# Patient Record
Sex: FEMALE | Race: WHITE | Employment: PART TIME | URBAN - METROPOLITAN AREA
[De-identification: names, ages, dates, MRNs, and addresses within clinical notes are randomized per-mention and may not be internally consistent; named-entity substitution may affect disease eponyms.]

---

## 2017-02-02 ENCOUNTER — TELEPHONE (OUTPATIENT)
Dept: FAMILY MEDICINE CLINIC | Facility: CLINIC | Age: 22
End: 2017-02-02

## 2017-02-02 NOTE — TELEPHONE ENCOUNTER
No PA required for Ondansetron 8mg tablets pt can take up to 1 tablet per day.   [facility]#[id number]

## 2017-02-08 ENCOUNTER — LAB ENCOUNTER (OUTPATIENT)
Dept: LAB | Age: 22
End: 2017-02-08
Attending: FAMILY MEDICINE
Payer: COMMERCIAL

## 2017-02-08 DIAGNOSIS — R79.89 ABNORMAL CBC: ICD-10-CM

## 2017-02-08 DIAGNOSIS — E55.9 VITAMIN D DEFICIENCY: ICD-10-CM

## 2017-02-08 LAB
25-HYDROXYVITAMIN D (TOTAL): 28.5 NG/ML (ref 30–100)
BASOPHILS # BLD AUTO: 0.06 X10(3) UL (ref 0–0.1)
BASOPHILS NFR BLD AUTO: 0.6 %
DEPRECATED HBV CORE AB SER IA-ACNC: 5.2 NG/ML (ref 10–291)
EOSINOPHIL # BLD AUTO: 0.27 X10(3) UL (ref 0–0.3)
EOSINOPHIL NFR BLD AUTO: 2.8 %
ERYTHROCYTE [DISTWIDTH] IN BLOOD BY AUTOMATED COUNT: 15.5 % (ref 11.5–16)
HCT VFR BLD AUTO: 40.7 % (ref 34–50)
HGB BLD-MCNC: 13.2 G/DL (ref 12–16)
IMMATURE GRANULOCYTE COUNT: 0.03 X10(3) UL (ref 0–1)
IMMATURE GRANULOCYTE RATIO %: 0.3 %
IRON SATURATION: 10 % (ref 13–45)
IRON: 38 UG/DL (ref 28–170)
LYMPHOCYTES # BLD AUTO: 2.03 X10(3) UL (ref 0.9–4)
LYMPHOCYTES NFR BLD AUTO: 21.4 %
MCH RBC QN AUTO: 26.9 PG (ref 27–33.2)
MCHC RBC AUTO-ENTMCNC: 32.4 G/DL (ref 31–37)
MCV RBC AUTO: 83.1 FL (ref 81–100)
MONOCYTES # BLD AUTO: 0.79 X10(3) UL (ref 0.1–0.6)
MONOCYTES NFR BLD AUTO: 8.3 %
NEUTROPHIL ABS PRELIM: 6.3 X10 (3) UL (ref 1.3–6.7)
NEUTROPHILS # BLD AUTO: 6.3 X10(3) UL (ref 1.3–6.7)
NEUTROPHILS NFR BLD AUTO: 66.6 %
PLATELET # BLD AUTO: 323 10(3)UL (ref 150–450)
RBC # BLD AUTO: 4.9 X10(6)UL (ref 3.8–5.1)
RED CELL DISTRIBUTION WIDTH-SD: 46.6 FL (ref 35.1–46.3)
TOTAL IRON BINDING CAPACITY: 392 UG/DL (ref 298–536)
TRANSFERRIN: 263 MG/DL (ref 200–360)
WBC # BLD AUTO: 9.5 X10(3) UL (ref 4–13)

## 2017-02-09 ENCOUNTER — TELEPHONE (OUTPATIENT)
Dept: FAMILY MEDICINE CLINIC | Facility: CLINIC | Age: 22
End: 2017-02-09

## 2017-02-09 DIAGNOSIS — R79.0 LOW IRON STORES: ICD-10-CM

## 2017-02-09 DIAGNOSIS — E55.9 VITAMIN D DEFICIENCY: Primary | ICD-10-CM

## 2017-02-09 RX ORDER — ERGOCALCIFEROL 1.25 MG/1
50000 CAPSULE ORAL WEEKLY
Qty: 12 CAPSULE | Refills: 0 | Status: SHIPPED | OUTPATIENT
Start: 2017-02-09 | End: 2017-05-10

## 2017-02-09 NOTE — TELEPHONE ENCOUNTER
Spoke to patient with results/instructions. Pt verbalized understanding.   Med sent to pharmacy and labs in system for 3 months

## 2017-02-09 NOTE — PROGRESS NOTES
Quick Note:    Lab results showed low Vitamin D. Advise RX vitamin D 50,000 IU once weekly for 12 weeks. Recheck vitamin D level in 3 months. After labs we will direct you on the dose of vitamin D needed OTC.     Ferritin is low this is her iron storage is

## 2017-02-09 NOTE — TELEPHONE ENCOUNTER
----- Message from Vikram Babcock PA-C sent at 2/8/2017  9:03 PM CST -----  Lab results showed low Vitamin D. Advise RX vitamin D 50,000 IU once weekly for 12 weeks. Recheck vitamin D level in 3 months.   After labs we will direct you on the dose of vit

## 2017-02-16 RX ORDER — LEVONORGESTREL AND ETHINYL ESTRADIOL 0.1-0.02MG
KIT ORAL
Qty: 28 TABLET | Refills: 1 | Status: SHIPPED | OUTPATIENT
Start: 2017-02-16 | End: 2017-03-13

## 2017-02-16 NOTE — TELEPHONE ENCOUNTER
rx for BCP approved qty 1 month + 1 refill  Patient due for annual physical 3/11/17 or later  Please call to schedule appt  [phone] (home)

## 2017-03-08 ENCOUNTER — APPOINTMENT (OUTPATIENT)
Dept: LAB | Age: 22
End: 2017-03-08
Attending: OTOLARYNGOLOGY
Payer: COMMERCIAL

## 2017-03-08 DIAGNOSIS — E04.2 MULTIPLE THYROID NODULES: ICD-10-CM

## 2017-03-08 DIAGNOSIS — E07.9 THYROID DYSFUNCTION: ICD-10-CM

## 2017-03-08 PROCEDURE — 82310 ASSAY OF CALCIUM: CPT

## 2017-03-08 PROCEDURE — 36415 COLL VENOUS BLD VENIPUNCTURE: CPT

## 2017-03-08 PROCEDURE — 83970 ASSAY OF PARATHORMONE: CPT

## 2017-03-13 ENCOUNTER — OFFICE VISIT (OUTPATIENT)
Dept: FAMILY MEDICINE CLINIC | Facility: CLINIC | Age: 22
End: 2017-03-13

## 2017-03-13 VITALS
WEIGHT: 215 LBS | HEART RATE: 92 BPM | HEIGHT: 69.25 IN | BODY MASS INDEX: 31.48 KG/M2 | DIASTOLIC BLOOD PRESSURE: 70 MMHG | SYSTOLIC BLOOD PRESSURE: 94 MMHG

## 2017-03-13 DIAGNOSIS — Z00.00 WELL FEMALE EXAM WITHOUT GYNECOLOGICAL EXAM: Primary | ICD-10-CM

## 2017-03-13 DIAGNOSIS — E66.9 OBESITY (BMI 30.0-34.9): ICD-10-CM

## 2017-03-13 DIAGNOSIS — E04.9 GOITER: ICD-10-CM

## 2017-03-13 DIAGNOSIS — F41.1 GAD (GENERALIZED ANXIETY DISORDER): ICD-10-CM

## 2017-03-13 DIAGNOSIS — F33.42 MAJOR DEPRESSIVE DISORDER, RECURRENT, IN FULL REMISSION WITH ANXIOUS DISTRESS (HCC): ICD-10-CM

## 2017-03-13 DIAGNOSIS — Z11.3 VENEREAL DISEASE SCREENING: ICD-10-CM

## 2017-03-13 DIAGNOSIS — F17.200 SMOKER: ICD-10-CM

## 2017-03-13 DIAGNOSIS — Z79.899 MEDICATION MANAGEMENT: ICD-10-CM

## 2017-03-13 PROCEDURE — 87491 CHLMYD TRACH DNA AMP PROBE: CPT | Performed by: FAMILY MEDICINE

## 2017-03-13 PROCEDURE — 87591 N.GONORRHOEAE DNA AMP PROB: CPT | Performed by: FAMILY MEDICINE

## 2017-03-13 PROCEDURE — 99395 PREV VISIT EST AGE 18-39: CPT | Performed by: FAMILY MEDICINE

## 2017-03-13 PROCEDURE — 99213 OFFICE O/P EST LOW 20 MIN: CPT | Performed by: FAMILY MEDICINE

## 2017-03-13 RX ORDER — ONDANSETRON HYDROCHLORIDE 8 MG/1
8 TABLET, FILM COATED ORAL DAILY
Qty: 30 TABLET | Refills: 5 | Status: SHIPPED | OUTPATIENT
Start: 2017-03-13 | End: 2017-09-27

## 2017-03-13 RX ORDER — LEVONORGESTREL AND ETHINYL ESTRADIOL 0.1-0.02MG
1 KIT ORAL
Qty: 28 TABLET | Refills: 11 | Status: SHIPPED | OUTPATIENT
Start: 2017-03-13 | End: 2018-02-12

## 2017-03-13 RX ORDER — BUSPIRONE HYDROCHLORIDE 10 MG/1
10 TABLET ORAL 3 TIMES DAILY
Qty: 90 TABLET | Refills: 5 | Status: SHIPPED | OUTPATIENT
Start: 2017-03-13 | End: 2017-10-25

## 2017-03-13 RX ORDER — CLONAZEPAM 0.5 MG/1
TABLET ORAL
Qty: 60 TABLET | Refills: 2 | Status: ON HOLD | OUTPATIENT
Start: 2017-03-13 | End: 2017-04-05

## 2017-03-13 NOTE — PROGRESS NOTES
HPI:   Mahin Soto is a 19year old female who presents for a complete physical exam. Symptoms: denies discharge, itching, burning or dysuria, periods once every 2 months.     pap 3/16 no abnormals  Sexually active yes occasional condom same partner p 10 MG Oral Tab Take 1 tablet (10 mg total) by mouth 3 (three) times daily. Disp: 90 tablet Rfl: 5   ergocalciferol 74304 units Oral Cap Take 1 capsule (50,000 Units total) by mouth once a week.  Disp: 12 capsule Rfl: 0      Past Medical History   Diagnosis 02/13/2017  Body mass index is 31.52 kg/(m^2).    GENERAL: well developed, well nourished and in no apparent distress  SKIN dry skin hyperkeratotic skin especially on legs  HEENT: atraumatic, normocephalic,ears, nose and throat are normal  EYES: PERRLA, EOM mouth 3 (three) times daily. Imaging & Consults:  None  1. Well female exam without gynecological exam  Self breast exam explained.  Health maintenance guidance given including vision and dental exams, vitamin D 1,000 iu daily with calcium 1,500 m (BMI 30.0-34. 9)  Weight loss through lifestyle changes decrease carbohydrates and increase exercise. - Lipid Panel [E]; Future    5. Venereal disease screening  Condoms advised  - Chlamydia/Gc Amplification [E];  Future  - Chlamydia/Gc Amplification [E]

## 2017-03-14 LAB
C TRACH DNA SPEC QL NAA+PROBE: NEGATIVE
N GONORRHOEA DNA SPEC QL NAA+PROBE: NEGATIVE

## 2017-03-15 ENCOUNTER — TELEPHONE (OUTPATIENT)
Dept: FAMILY MEDICINE CLINIC | Facility: CLINIC | Age: 22
End: 2017-03-15

## 2017-03-15 NOTE — TELEPHONE ENCOUNTER
----- Message from Radha Esparza PA-C sent at 3/14/2017  8:22 PM CDT -----  Negative gonorrhea and Chlamydia

## 2017-04-05 ENCOUNTER — ANESTHESIA EVENT (OUTPATIENT)
Dept: SURGERY | Facility: HOSPITAL | Age: 22
End: 2017-04-05
Payer: COMMERCIAL

## 2017-04-05 ENCOUNTER — SURGERY (OUTPATIENT)
Age: 22
End: 2017-04-05

## 2017-04-05 ENCOUNTER — HOSPITAL ENCOUNTER (OUTPATIENT)
Facility: HOSPITAL | Age: 22
Setting detail: OBSERVATION
Discharge: HOME | End: 2017-04-06
Attending: OTOLARYNGOLOGY | Admitting: OTOLARYNGOLOGY
Payer: COMMERCIAL

## 2017-04-05 ENCOUNTER — ANESTHESIA (OUTPATIENT)
Dept: SURGERY | Facility: HOSPITAL | Age: 22
End: 2017-04-05
Payer: COMMERCIAL

## 2017-04-05 DIAGNOSIS — E07.9 DISEASE OF THYROID GLAND: ICD-10-CM

## 2017-04-05 DIAGNOSIS — E04.2 NONTOXIC MULTINODULAR GOITER: ICD-10-CM

## 2017-04-05 PROCEDURE — 88342 IMHCHEM/IMCYTCHM 1ST ANTB: CPT | Performed by: OTOLARYNGOLOGY

## 2017-04-05 PROCEDURE — 88341 IMHCHEM/IMCYTCHM EA ADD ANTB: CPT | Performed by: OTOLARYNGOLOGY

## 2017-04-05 PROCEDURE — 0GTK0ZZ RESECTION OF THYROID GLAND, OPEN APPROACH: ICD-10-PCS | Performed by: OTOLARYNGOLOGY

## 2017-04-05 PROCEDURE — 83970 ASSAY OF PARATHORMONE: CPT | Performed by: OTOLARYNGOLOGY

## 2017-04-05 PROCEDURE — 82310 ASSAY OF CALCIUM: CPT | Performed by: OTOLARYNGOLOGY

## 2017-04-05 PROCEDURE — 83735 ASSAY OF MAGNESIUM: CPT | Performed by: OTOLARYNGOLOGY

## 2017-04-05 PROCEDURE — 81025 URINE PREGNANCY TEST: CPT | Performed by: OTOLARYNGOLOGY

## 2017-04-05 PROCEDURE — 88307 TISSUE EXAM BY PATHOLOGIST: CPT | Performed by: OTOLARYNGOLOGY

## 2017-04-05 RX ORDER — DIPHENHYDRAMINE HYDROCHLORIDE 50 MG/ML
12.5 INJECTION INTRAMUSCULAR; INTRAVENOUS AS NEEDED
Status: DISCONTINUED | OUTPATIENT
Start: 2017-04-05 | End: 2017-04-05 | Stop reason: HOSPADM

## 2017-04-05 RX ORDER — CALCITRIOL 0.25 UG/1
0.25 CAPSULE, LIQUID FILLED ORAL ONCE
Status: COMPLETED | OUTPATIENT
Start: 2017-04-05 | End: 2017-04-05

## 2017-04-05 RX ORDER — CALCIUM CARBONATE 500(1250)
TABLET ORAL
Status: COMPLETED
Start: 2017-04-05 | End: 2017-04-05

## 2017-04-05 RX ORDER — SODIUM CHLORIDE, SODIUM LACTATE, POTASSIUM CHLORIDE, CALCIUM CHLORIDE 600; 310; 30; 20 MG/100ML; MG/100ML; MG/100ML; MG/100ML
INJECTION, SOLUTION INTRAVENOUS CONTINUOUS
Status: DISCONTINUED | OUTPATIENT
Start: 2017-04-05 | End: 2017-04-05

## 2017-04-05 RX ORDER — HYDROCODONE BITARTRATE AND ACETAMINOPHEN 5; 325 MG/1; MG/1
1 TABLET ORAL AS NEEDED
Status: DISCONTINUED | OUTPATIENT
Start: 2017-04-05 | End: 2017-04-05 | Stop reason: HOSPADM

## 2017-04-05 RX ORDER — HYDROCODONE BITARTRATE AND ACETAMINOPHEN 5; 325 MG/1; MG/1
2 TABLET ORAL AS NEEDED
Status: DISCONTINUED | OUTPATIENT
Start: 2017-04-05 | End: 2017-04-05 | Stop reason: HOSPADM

## 2017-04-05 RX ORDER — NALOXONE HYDROCHLORIDE 0.4 MG/ML
80 INJECTION, SOLUTION INTRAMUSCULAR; INTRAVENOUS; SUBCUTANEOUS AS NEEDED
Status: DISCONTINUED | OUTPATIENT
Start: 2017-04-05 | End: 2017-04-05 | Stop reason: HOSPADM

## 2017-04-05 RX ORDER — CLONAZEPAM 0.5 MG/1
TABLET ORAL 2 TIMES DAILY PRN
COMMUNITY
End: 2017-10-26

## 2017-04-05 RX ORDER — HYDROCODONE BITARTRATE AND ACETAMINOPHEN 5; 325 MG/1; MG/1
1 TABLET ORAL EVERY 4 HOURS PRN
Status: DISCONTINUED | OUTPATIENT
Start: 2017-04-05 | End: 2017-04-06

## 2017-04-05 RX ORDER — ONDANSETRON 2 MG/ML
4 INJECTION INTRAMUSCULAR; INTRAVENOUS AS NEEDED
Status: DISCONTINUED | OUTPATIENT
Start: 2017-04-05 | End: 2017-04-05 | Stop reason: HOSPADM

## 2017-04-05 RX ORDER — ACETAMINOPHEN 325 MG/1
650 TABLET ORAL EVERY 4 HOURS PRN
Status: DISCONTINUED | OUTPATIENT
Start: 2017-04-05 | End: 2017-04-06

## 2017-04-05 RX ORDER — CALCIUM GLUCONATE-DEXTROSE IV SOLN 2 GRAM/100ML-5% 2-5/100 GM/ML-%
2 SOLUTION INTRAVENOUS ONCE AS NEEDED
Status: ACTIVE | OUTPATIENT
Start: 2017-04-05 | End: 2017-04-05

## 2017-04-05 RX ORDER — MIDAZOLAM HYDROCHLORIDE 1 MG/ML
1 INJECTION INTRAMUSCULAR; INTRAVENOUS EVERY 5 MIN PRN
Status: DISCONTINUED | OUTPATIENT
Start: 2017-04-05 | End: 2017-04-05 | Stop reason: HOSPADM

## 2017-04-05 RX ORDER — BUPIVACAINE HYDROCHLORIDE AND EPINEPHRINE 5; 5 MG/ML; UG/ML
INJECTION, SOLUTION EPIDURAL; INTRACAUDAL; PERINEURAL AS NEEDED
Status: DISCONTINUED | OUTPATIENT
Start: 2017-04-05 | End: 2017-04-05 | Stop reason: HOSPADM

## 2017-04-05 RX ORDER — LABETALOL HYDROCHLORIDE 5 MG/ML
5 INJECTION, SOLUTION INTRAVENOUS EVERY 10 MIN PRN
Status: DISCONTINUED | OUTPATIENT
Start: 2017-04-05 | End: 2017-04-05 | Stop reason: HOSPADM

## 2017-04-05 RX ORDER — CALCIUM CARBONATE 500(1250)
1000 TABLET ORAL
Status: DISCONTINUED | OUTPATIENT
Start: 2017-04-05 | End: 2017-04-06

## 2017-04-05 RX ORDER — DIPHENHYDRAMINE HYDROCHLORIDE 50 MG/ML
INJECTION INTRAMUSCULAR; INTRAVENOUS
Status: COMPLETED
Start: 2017-04-05 | End: 2017-04-05

## 2017-04-05 RX ORDER — MEPERIDINE HYDROCHLORIDE 25 MG/ML
INJECTION INTRAMUSCULAR; INTRAVENOUS; SUBCUTANEOUS
Status: COMPLETED
Start: 2017-04-05 | End: 2017-04-05

## 2017-04-05 RX ORDER — HYDROMORPHONE HYDROCHLORIDE 1 MG/ML
INJECTION, SOLUTION INTRAMUSCULAR; INTRAVENOUS; SUBCUTANEOUS
Status: COMPLETED
Start: 2017-04-05 | End: 2017-04-05

## 2017-04-05 RX ORDER — HYDROCODONE BITARTRATE AND ACETAMINOPHEN 5; 325 MG/1; MG/1
2 TABLET ORAL EVERY 4 HOURS PRN
Status: DISCONTINUED | OUTPATIENT
Start: 2017-04-05 | End: 2017-04-06

## 2017-04-05 RX ORDER — CALCIUM CARBONATE 500(1250)
1000 TABLET ORAL ONCE
Status: COMPLETED | OUTPATIENT
Start: 2017-04-05 | End: 2017-04-05

## 2017-04-05 RX ORDER — DEXTROSE, SODIUM CHLORIDE, SODIUM LACTATE, POTASSIUM CHLORIDE, AND CALCIUM CHLORIDE 5; .6; .31; .03; .02 G/100ML; G/100ML; G/100ML; G/100ML; G/100ML
INJECTION, SOLUTION INTRAVENOUS CONTINUOUS
Status: DISCONTINUED | OUTPATIENT
Start: 2017-04-05 | End: 2017-04-06

## 2017-04-05 RX ORDER — LABETALOL HYDROCHLORIDE 5 MG/ML
INJECTION, SOLUTION INTRAVENOUS
Status: COMPLETED
Start: 2017-04-05 | End: 2017-04-05

## 2017-04-05 RX ORDER — LEVONORGESTREL AND ETHINYL ESTRADIOL 0.1-0.02MG
1 KIT ORAL
Status: DISCONTINUED | OUTPATIENT
Start: 2017-04-05 | End: 2017-04-06

## 2017-04-05 RX ORDER — MIDAZOLAM HYDROCHLORIDE 1 MG/ML
INJECTION INTRAMUSCULAR; INTRAVENOUS
Status: COMPLETED
Start: 2017-04-05 | End: 2017-04-05

## 2017-04-05 RX ORDER — HYDROMORPHONE HYDROCHLORIDE 1 MG/ML
0.4 INJECTION, SOLUTION INTRAMUSCULAR; INTRAVENOUS; SUBCUTANEOUS EVERY 5 MIN PRN
Status: DISCONTINUED | OUTPATIENT
Start: 2017-04-05 | End: 2017-04-05 | Stop reason: HOSPADM

## 2017-04-05 RX ORDER — MEPERIDINE HYDROCHLORIDE 25 MG/ML
12.5 INJECTION INTRAMUSCULAR; INTRAVENOUS; SUBCUTANEOUS AS NEEDED
Status: COMPLETED | OUTPATIENT
Start: 2017-04-05 | End: 2017-04-05

## 2017-04-05 RX ORDER — CALCITRIOL 0.25 UG/1
CAPSULE, LIQUID FILLED ORAL
Status: COMPLETED
Start: 2017-04-05 | End: 2017-04-05

## 2017-04-05 RX ORDER — ONDANSETRON 2 MG/ML
4 INJECTION INTRAMUSCULAR; INTRAVENOUS EVERY 6 HOURS PRN
Status: DISCONTINUED | OUTPATIENT
Start: 2017-04-05 | End: 2017-04-06

## 2017-04-05 RX ORDER — CALCITRIOL 0.25 UG/1
0.25 CAPSULE, LIQUID FILLED ORAL DAILY
Status: DISCONTINUED | OUTPATIENT
Start: 2017-04-05 | End: 2017-04-06

## 2017-04-05 NOTE — ANESTHESIA POSTPROCEDURE EVALUATION
[address] [place],9D Patient Status:  Outpatient in a Bed   Age/Gender 19year old female MRN [id number]   [facility] SURGERY Attending Laurita Singh, [address] [facility] [address] Day # 0 PCP Mehul Coley DO       Anesthesia Post-op Note

## 2017-04-05 NOTE — PLAN OF CARE
Pt tolerated CL without nausea; diet advanced to low residue. IVF discontinued; pt saline-locked. Pt up to bathroom with steady gait and voided without container. Anterior neck incision CDI; no edema. Pt denies any numbness/tingling or cramping.  HOB re

## 2017-04-05 NOTE — PLAN OF CARE
METABOLIC/FLUID AND ELECTROLYTES - ADULT    • Electrolytes maintained within normal limits Progressing        NEUROLOGICAL - ADULT    • Achieves stable or improved neurological status Progressing        PAIN - ADULT    • Verbalizes/displays adequate comfor

## 2017-04-05 NOTE — ANESTHESIA PREPROCEDURE EVALUATION
PRE-OP EVALUATION    Patient Name: Isela Rogel    Pre-op Diagnosis: Nontoxic multinodular goiter [E04.2]  Disease of thyroid gland [E07.9]    Procedure(s):  Total Thyroidectomy, Right lobe first    Surgeon(s) and Role:     Julia Sanchez MD - Pr Types: Cigarettes    Smokeless tobacco: Never Used    Comment: 6 cigarettes daily    Alcohol Use: Yes    Comment: rarely       Drug Use: No     Available pre-op labs reviewed.     Lab Results  Component Value Date   WBC 9.5 02/08/2017   RBC 4.90 02/08/2017

## 2017-04-05 NOTE — BRIEF OP NOTE
[address] SURGERY  Brief Op Note     [place] Location: OR   CSN [id number] MRN [id number]   Admission Date 4/5/2017 Operation Date 4/5/2017   Attending Physician Carola Sosa MD Operating Physician Kathleene Holter, MD       Pre-Operative

## 2017-04-06 VITALS
OXYGEN SATURATION: 100 % | WEIGHT: 215 LBS | TEMPERATURE: 99 F | HEART RATE: 85 BPM | SYSTOLIC BLOOD PRESSURE: 122 MMHG | RESPIRATION RATE: 18 BRPM | HEIGHT: 69 IN | BODY MASS INDEX: 31.84 KG/M2 | DIASTOLIC BLOOD PRESSURE: 74 MMHG

## 2017-04-06 PROCEDURE — 83735 ASSAY OF MAGNESIUM: CPT | Performed by: OTOLARYNGOLOGY

## 2017-04-06 PROCEDURE — 82310 ASSAY OF CALCIUM: CPT | Performed by: OTOLARYNGOLOGY

## 2017-04-06 RX ORDER — CALCIUM CARBONATE/VITAMIN D3 600 MG-10
2 TABLET ORAL 2 TIMES DAILY
Qty: 120 TABLET | Refills: 0 | Status: SHIPPED | OUTPATIENT
Start: 2017-04-06 | End: 2017-05-06

## 2017-04-06 RX ORDER — LEVOTHYROXINE SODIUM 0.1 MG/1
100 TABLET ORAL
Qty: 30 TABLET | Refills: 1 | Status: SHIPPED | OUTPATIENT
Start: 2017-04-06 | End: 2017-05-06

## 2017-04-06 NOTE — PLAN OF CARE
NEUROLOGICAL - ADULT    • Achieves stable or improved neurological status Progressing          METABOLIC/FLUID AND ELECTROLYTES - ADULT    • Electrolytes maintained within normal limits Progressing          PAIN - ADULT    • Verbalizes/displays adequate co

## 2017-04-06 NOTE — PROGRESS NOTES
Patient is awake and alert s/p total thyroidectomy. Voice is strong, denies any pain, dysphagia, numbness, tingling or muscle cramping. Pain is well tolerated. Eating and drinking is well tolerated.   Overall no significant complaints, patient doing very

## 2017-04-06 NOTE — PLAN OF CARE
METABOLIC/FLUID AND ELECTROLYTES - ADULT    • Electrolytes maintained within normal limits Completed        NEUROLOGICAL - ADULT    • Achieves stable or improved neurological status Completed        PAIN - ADULT    • Verbalizes/displays adequate comfort le

## 2017-04-06 NOTE — PAYOR COMM NOTE
Attending Physician: No att. providers found    4/5    DIRECT FOR OR         Pre-Operative Diagnosis: Nontoxic multinodular goiter [E04.2]  Disease of thyroid gland [E07.9]     Post-Operative Diagnosis: Nontoxic multinodular goiter [E04. 2]Disease of thyroi

## 2017-04-06 NOTE — PLAN OF CARE
Written and verbal discharge instructions given to patient and verbalize understanding. IV discontinued in LT H angio-cath tip intact, site free from redness, swelling, or drainage, patient denies pain at site. Dressing applied. No prescription given.   Pa

## 2017-04-11 NOTE — OPERATIVE REPORT
[facility]    PATIENT'S NAME: Wendiia Jeff   ATTENDING PHYSICIAN: Adam Driscoll M.D. OPERATING PHYSICIAN: Adam Driscoll M.D.    PATIENT ACCOUNT#:   [de-identified]    [facility]  MEDICAL RECORD #:   [id number]       DATE OF topical Hesham was placed. The wound was closed in layered fashion with 3-0 Vicryl through strap muscles, 4-0 Monocryl subcutaneously, Steri-Strips on the surface.       Dictated By Aicha Young M.D.  d: 04/10/2017 14:58:27  t: 04/10/2017 20:51:31  J

## 2017-04-19 ENCOUNTER — APPOINTMENT (OUTPATIENT)
Dept: LAB | Age: 22
End: 2017-04-19
Attending: OTOLARYNGOLOGY
Payer: COMMERCIAL

## 2017-04-19 DIAGNOSIS — E89.0 S/P THYROIDECTOMY: ICD-10-CM

## 2017-04-19 DIAGNOSIS — E07.9 THYROID DYSFUNCTION: ICD-10-CM

## 2017-04-19 PROCEDURE — 83970 ASSAY OF PARATHORMONE: CPT

## 2017-04-19 PROCEDURE — 36415 COLL VENOUS BLD VENIPUNCTURE: CPT

## 2017-04-19 PROCEDURE — 82310 ASSAY OF CALCIUM: CPT

## 2017-04-26 NOTE — PROGRESS NOTES
Quick Note:    Ca/pth normal ok to decrease ca/vit d supplement to caltrate d 1 tab po bid for good bone support  ______

## 2017-05-26 ENCOUNTER — APPOINTMENT (OUTPATIENT)
Dept: LAB | Age: 22
End: 2017-05-26
Attending: OTOLARYNGOLOGY
Payer: COMMERCIAL

## 2017-05-26 DIAGNOSIS — E07.9 THYROID DYSFUNCTION: ICD-10-CM

## 2017-05-26 DIAGNOSIS — E89.0 S/P THYROIDECTOMY: ICD-10-CM

## 2017-05-26 PROCEDURE — 84439 ASSAY OF FREE THYROXINE: CPT

## 2017-05-26 PROCEDURE — 84443 ASSAY THYROID STIM HORMONE: CPT

## 2017-05-26 PROCEDURE — 36415 COLL VENOUS BLD VENIPUNCTURE: CPT

## 2017-05-30 NOTE — PROGRESS NOTES
Quick Note:    tsh is hypothyroid normal ft4 if still on 100mcg increase to 125mcg and repeat tsh ft4 in 6 weeks  ______

## 2017-07-13 ENCOUNTER — LAB ENCOUNTER (OUTPATIENT)
Dept: LAB | Age: 22
End: 2017-07-13
Attending: OTOLARYNGOLOGY
Payer: COMMERCIAL

## 2017-07-13 DIAGNOSIS — E07.9 THYROID DYSFUNCTION: ICD-10-CM

## 2017-07-13 LAB
FREE T4: 1.4 NG/DL (ref 0.9–1.8)
TSI SER-ACNC: 0.94 MIU/ML (ref 0.35–5.5)

## 2017-07-13 PROCEDURE — 84439 ASSAY OF FREE THYROXINE: CPT

## 2017-07-13 PROCEDURE — 84443 ASSAY THYROID STIM HORMONE: CPT

## 2017-07-13 PROCEDURE — 36415 COLL VENOUS BLD VENIPUNCTURE: CPT

## 2017-07-24 NOTE — PROGRESS NOTES
Per patient request, her [facility] paperwork was successfully faxed to [address] at [phone].

## 2017-07-24 NOTE — PATIENT INSTRUCTIONS
SCHEDULING [facility] APPOINTMENTS ONLINE    Lab appointments can now be scheduled online at [email or web address]    · Go to [email or web address]  · In Search type Lab  · Click \"Lab services\"  · Click \"Schedule Your Test Online\"  · Follow the prompts  · If you than the smoke from a regular cigarette. But the FDA says that these devices may still have substances that can cause cancer. E-cigarettes are not well regulated. They have not been studied enough to know if they are a good aid to help you stop smoking.  Ta

## 2017-07-24 NOTE — PROGRESS NOTES
Moni Mathew is a 19year old female.   HPI:   Here for follow up Chronic depression and ELI  --mood is overall better does get bad at times rare thoughts of hurting herself not active   --2.5 years of counseling  --FMLA forms needed [employer] (two) times daily. Disp: 1 Bottle Rfl: 0   Levothyroxine Sodium 125 MCG Oral Tab Take 1 tablet (125 mcg total) by mouth daily. Disp: 30 tablet Rfl: 1   ClonazePAM 0.5 MG Oral Tab Take 0.25-0.5 mg by mouth 2 (two) times daily as needed for Anxiety.  Disp:  R palpation  LUNGS: clear to auscultation no rales, rhonchi or wheezes  CARDIO: RRR without murmur  EXTREMITIES: no cyanosis, clubbing or edema  Musculoskeletal: No gross deficit  Neurological: nerves II through XII grossly intact no sensorimotor deficit  Ps

## 2017-09-27 RX ORDER — ONDANSETRON HYDROCHLORIDE 8 MG/1
TABLET, FILM COATED ORAL
Qty: 10 TABLET | Refills: 4 | Status: SHIPPED | OUTPATIENT
Start: 2017-09-27 | End: 2017-11-08

## 2017-10-17 RX ORDER — VORTIOXETINE 10 MG/1
TABLET, FILM COATED ORAL
Qty: 30 TABLET | Refills: 3 | Status: SHIPPED | OUTPATIENT
Start: 2017-10-17 | End: 2018-02-12

## 2017-10-25 RX ORDER — BUSPIRONE HYDROCHLORIDE 10 MG/1
TABLET ORAL
Qty: 90 TABLET | Refills: 0 | Status: SHIPPED | OUTPATIENT
Start: 2017-10-25 | End: 2017-11-21

## 2017-10-26 RX ORDER — CLONAZEPAM 0.5 MG/1
TABLET ORAL 2 TIMES DAILY PRN
Qty: 60 TABLET | Refills: 2 | Status: SHIPPED | OUTPATIENT
Start: 2017-10-26 | End: 2018-02-12

## 2017-10-26 NOTE — TELEPHONE ENCOUNTER
Faxed refill request received for clonazepam  Please advise refill  Last rx 3/13/17 qty 60 + 2 refills  Last ov 7/24/2017-f/u in 6 months

## 2017-11-08 RX ORDER — ONDANSETRON HYDROCHLORIDE 8 MG/1
TABLET, FILM COATED ORAL
Qty: 10 TABLET | Refills: 3 | Status: SHIPPED | OUTPATIENT
Start: 2017-11-08 | End: 2017-12-24

## 2017-11-21 RX ORDER — BUSPIRONE HYDROCHLORIDE 10 MG/1
TABLET ORAL
Qty: 90 TABLET | Refills: 1 | Status: SHIPPED | OUTPATIENT
Start: 2017-11-21 | End: 2018-03-15

## 2017-11-28 ENCOUNTER — LAB ENCOUNTER (OUTPATIENT)
Dept: LAB | Age: 22
End: 2017-11-28
Attending: OTOLARYNGOLOGY
Payer: COMMERCIAL

## 2017-11-28 DIAGNOSIS — E07.9 THYROID DYSFUNCTION: ICD-10-CM

## 2017-11-28 PROCEDURE — 84439 ASSAY OF FREE THYROXINE: CPT

## 2017-11-28 PROCEDURE — 84443 ASSAY THYROID STIM HORMONE: CPT

## 2017-11-28 PROCEDURE — 36415 COLL VENOUS BLD VENIPUNCTURE: CPT

## 2017-12-26 RX ORDER — ONDANSETRON HYDROCHLORIDE 8 MG/1
TABLET, FILM COATED ORAL
Qty: 10 TABLET | Refills: 2 | Status: SHIPPED | OUTPATIENT
Start: 2017-12-26 | End: 2018-01-30

## 2018-01-19 ENCOUNTER — OFFICE VISIT (OUTPATIENT)
Dept: FAMILY MEDICINE CLINIC | Facility: CLINIC | Age: 23
End: 2018-01-19

## 2018-01-19 VITALS
HEART RATE: 76 BPM | HEIGHT: 69.09 IN | DIASTOLIC BLOOD PRESSURE: 54 MMHG | BODY MASS INDEX: 32.29 KG/M2 | WEIGHT: 218 LBS | SYSTOLIC BLOOD PRESSURE: 101 MMHG | TEMPERATURE: 96 F

## 2018-01-19 DIAGNOSIS — L02.91 ABSCESS: Primary | ICD-10-CM

## 2018-01-19 PROCEDURE — 87205 SMEAR GRAM STAIN: CPT | Performed by: FAMILY MEDICINE

## 2018-01-19 PROCEDURE — 99213 OFFICE O/P EST LOW 20 MIN: CPT | Performed by: FAMILY MEDICINE

## 2018-01-19 PROCEDURE — 87077 CULTURE AEROBIC IDENTIFY: CPT | Performed by: FAMILY MEDICINE

## 2018-01-19 PROCEDURE — 87070 CULTURE OTHR SPECIMN AEROBIC: CPT | Performed by: FAMILY MEDICINE

## 2018-01-19 RX ORDER — AMOXICILLIN AND CLAVULANATE POTASSIUM 875; 125 MG/1; MG/1
1 TABLET, FILM COATED ORAL 2 TIMES DAILY
Qty: 10 TABLET | Refills: 0 | Status: SHIPPED | OUTPATIENT
Start: 2018-01-19 | End: 2018-01-29

## 2018-01-19 NOTE — PROGRESS NOTES
Gildardo Goldstein is a 23year old female. HPI:   Patient is in for evaluation of a swelling on the left labia majora started about 2 weeks ago after having a waxing. States that she has been experiencing some discharge from the area more recently.   No f denies shortness of breath with exertion  CARDIOVASCULAR: denies chest pain on exertion  GI: denies abdominal pain and denies heartburn  NEURO: denies headaches  Musculoskeletal: No motor deficits  EXAM:   /54 (BP Location: Right arm, Patient Positio

## 2018-01-19 NOTE — PATIENT INSTRUCTIONS
SCHEDULING [facility] APPOINTMENTS ONLINE    Lab appointments can now be scheduled online at [email or web address]    · Go to [email or web address]  · In Search type Lab  · Click \"Lab services\"  · Click \"Schedule Your Test Online\"  · Follow the prompts  · If you prompt medical attention if any of these occur:  · An increase in redness or swelling  · Red streaks in the skin leading away from the abscess  · An increase in local pain or swelling  · Fever of 100.4ºF (38ºC) or higher, or as directed by your healthcare

## 2018-01-30 RX ORDER — ONDANSETRON HYDROCHLORIDE 8 MG/1
TABLET, FILM COATED ORAL
Qty: 10 TABLET | Refills: 1 | Status: SHIPPED | OUTPATIENT
Start: 2018-01-30 | End: 2018-02-12

## 2018-02-05 RX ORDER — LEVONORGESTREL AND ETHINYL ESTRADIOL 0.1-0.02MG
KIT ORAL
Qty: 28 TABLET | Refills: 10 | OUTPATIENT
Start: 2018-02-05

## 2018-02-12 NOTE — PROGRESS NOTES
Eliott Cheadle is a 23year old female. HPI:   Patient is in for follow-up on long-standing depression chronic and generalized anxiety disorder. Patient is continue with a counselor on a weekly basis. Still has her anxiety. and uses klonopin 0.5 mg tw of the days  9. Suicidality: Thoughts of death or suicide, or has suicide plan   No suicidal or homicidal thoughts. PHQ 9 is at a 11.     Current Outpatient Prescriptions:  Levonorgestrel-Ethinyl Estrad (FALMINA) 0.1-20 MG-MCG Oral Tab Take 1 tablet by m well developed, well nourished,in no apparent distress  SKIN: no rashes,no suspicious lesions  HEENT: TM clear bilaterally, nose no congestion, throat clear no erythema without mass.    EYES: PERRLA, EOM intact, sclera clear without injection  NECK: supple, Continue with counseling weekly  - Ondansetron HCl (ZOFRAN) 8 MG tablet; TAKE ONE TABLET BY MOUTH DAILY WITH TRINTELLEX  Dispense: 30 tablet; Refill: 5  - Vortioxetine HBr (TRINTELLIX) 10 MG Oral Tab; Take 1 tablet (10 mg total) by mouth once daily.   D

## 2018-02-12 NOTE — PATIENT INSTRUCTIONS
SCHEDULING [facility] APPOINTMENTS ONLINE    Lab appointments can now be scheduled online at [email or web address]    · Go to [email or web address]  · In Search type Lab  · Click \"Lab services\"  · Click \"Schedule Your Test Online\"  · Follow the prompts  · If you Feeling lonely or bored Call a friend to talk         Tips for quitting successfully  · List the benefits of quitting such as reducing heart risks and saving money. Keep this list and review it whenever you feel like smoking. · Get support.  Let your frien

## 2018-02-13 RX ORDER — VORTIOXETINE 10 MG/1
TABLET, FILM COATED ORAL
Qty: 30 TABLET | Refills: 2 | OUTPATIENT
Start: 2018-02-13

## 2018-03-15 ENCOUNTER — LAB ENCOUNTER (OUTPATIENT)
Dept: LAB | Age: 23
End: 2018-03-15
Attending: FAMILY MEDICINE
Payer: COMMERCIAL

## 2018-03-15 ENCOUNTER — OFFICE VISIT (OUTPATIENT)
Dept: FAMILY MEDICINE CLINIC | Facility: CLINIC | Age: 23
End: 2018-03-15

## 2018-03-15 VITALS
SYSTOLIC BLOOD PRESSURE: 90 MMHG | HEIGHT: 69.09 IN | DIASTOLIC BLOOD PRESSURE: 70 MMHG | WEIGHT: 214 LBS | HEART RATE: 76 BPM | BODY MASS INDEX: 31.7 KG/M2

## 2018-03-15 DIAGNOSIS — F33.42 MAJOR DEPRESSIVE DISORDER, RECURRENT, IN FULL REMISSION WITH ANXIOUS DISTRESS (HCC): ICD-10-CM

## 2018-03-15 DIAGNOSIS — Z11.3 SCREENING FOR GONORRHEA: ICD-10-CM

## 2018-03-15 DIAGNOSIS — F41.1 GAD (GENERALIZED ANXIETY DISORDER): ICD-10-CM

## 2018-03-15 DIAGNOSIS — Z12.4 SCREENING FOR MALIGNANT NEOPLASM OF CERVIX: ICD-10-CM

## 2018-03-15 DIAGNOSIS — N91.2 AMENORRHEA: ICD-10-CM

## 2018-03-15 DIAGNOSIS — Z00.00 LABORATORY EXAMINATION ORDERED AS PART OF A ROUTINE GENERAL MEDICAL EXAMINATION: ICD-10-CM

## 2018-03-15 DIAGNOSIS — Z01.419 WELL FEMALE EXAM WITH ROUTINE GYNECOLOGICAL EXAM: Primary | ICD-10-CM

## 2018-03-15 DIAGNOSIS — Z11.8 SCREENING FOR CHLAMYDIAL DISEASE: ICD-10-CM

## 2018-03-15 DIAGNOSIS — Z23 NEED FOR VACCINATION: ICD-10-CM

## 2018-03-15 DIAGNOSIS — F17.200 SMOKER: ICD-10-CM

## 2018-03-15 DIAGNOSIS — N90.7 SEBACEOUS CYST OF LABIA: ICD-10-CM

## 2018-03-15 DIAGNOSIS — L85.9 HYPERKERATOSIS OF SKIN: ICD-10-CM

## 2018-03-15 PROBLEM — E89.0 HYPOTHYROIDISM ASSOCIATED WITH SURGICAL PROCEDURE: Status: ACTIVE | Noted: 2018-03-15

## 2018-03-15 PROBLEM — E04.2 NONTOXIC MULTINODULAR GOITER: Status: RESOLVED | Noted: 2017-04-05 | Resolved: 2018-03-15

## 2018-03-15 LAB
25-HYDROXYVITAMIN D (TOTAL): 21.7 NG/ML (ref 30–100)
ALBUMIN SERPL-MCNC: 3.8 G/DL (ref 3.5–4.8)
ALP LIVER SERPL-CCNC: 108 U/L (ref 37–98)
ALT SERPL-CCNC: 17 U/L (ref 14–54)
AST SERPL-CCNC: 12 U/L (ref 15–41)
BASOPHILS # BLD AUTO: 0.09 X10(3) UL (ref 0–0.1)
BASOPHILS NFR BLD AUTO: 1 %
BILIRUB SERPL-MCNC: 0.6 MG/DL (ref 0.1–2)
BUN BLD-MCNC: 11 MG/DL (ref 8–20)
CALCIUM BLD-MCNC: 9 MG/DL (ref 8.3–10.3)
CHLORIDE: 109 MMOL/L (ref 101–111)
CHOLEST SMN-MCNC: 211 MG/DL (ref ?–190)
CO2: 23 MMOL/L (ref 22–32)
CREAT BLD-MCNC: 0.89 MG/DL (ref 0.55–1.02)
EOSINOPHIL # BLD AUTO: 0.28 X10(3) UL (ref 0–0.3)
EOSINOPHIL NFR BLD AUTO: 3.2 %
ERYTHROCYTE [DISTWIDTH] IN BLOOD BY AUTOMATED COUNT: 13 % (ref 11.5–16)
GLUCOSE BLD-MCNC: 83 MG/DL (ref 70–99)
HAV AB SERPL IA-ACNC: 495 PG/ML (ref 193–986)
HCG QUANTITATIVE: <1 MIU/ML (ref ?–3)
HCT VFR BLD AUTO: 45.4 % (ref 34–50)
HDLC SERPL-MCNC: 28 MG/DL (ref 45–?)
HDLC SERPL: 7.54 {RATIO} (ref ?–4.44)
HGB BLD-MCNC: 14.9 G/DL (ref 12–16)
IMMATURE GRANULOCYTE COUNT: 0.03 X10(3) UL (ref 0–1)
IMMATURE GRANULOCYTE RATIO %: 0.3 %
LDLC SERPL CALC-MCNC: 158 MG/DL (ref ?–120)
LYMPHOCYTES # BLD AUTO: 2.42 X10(3) UL (ref 0.9–4)
LYMPHOCYTES NFR BLD AUTO: 27.8 %
M PROTEIN MFR SERPL ELPH: 7.6 G/DL (ref 6.1–8.3)
MCH RBC QN AUTO: 29 PG (ref 27–33.2)
MCHC RBC AUTO-ENTMCNC: 32.8 G/DL (ref 31–37)
MCV RBC AUTO: 88.5 FL (ref 81–100)
MONOCYTES # BLD AUTO: 0.7 X10(3) UL (ref 0.1–1)
MONOCYTES NFR BLD AUTO: 8 %
NEUTROPHIL ABS PRELIM: 5.19 X10 (3) UL (ref 1.3–6.7)
NEUTROPHILS # BLD AUTO: 5.19 X10(3) UL (ref 1.3–6.7)
NEUTROPHILS NFR BLD AUTO: 59.7 %
NONHDLC SERPL-MCNC: 183 MG/DL (ref ?–150)
PLATELET # BLD AUTO: 324 10(3)UL (ref 150–450)
POTASSIUM SERPL-SCNC: 3.8 MMOL/L (ref 3.6–5.1)
RBC # BLD AUTO: 5.13 X10(6)UL (ref 3.8–5.1)
RED CELL DISTRIBUTION WIDTH-SD: 42.6 FL (ref 35.1–46.3)
SODIUM SERPL-SCNC: 139 MMOL/L (ref 136–144)
TRIGL SERPL-MCNC: 124 MG/DL (ref ?–115)
VLDLC SERPL CALC-MCNC: 25 MG/DL (ref 5–40)
WBC # BLD AUTO: 8.7 X10(3) UL (ref 4–13)

## 2018-03-15 PROCEDURE — 87491 CHLMYD TRACH DNA AMP PROBE: CPT | Performed by: FAMILY MEDICINE

## 2018-03-15 PROCEDURE — 99395 PREV VISIT EST AGE 18-39: CPT | Performed by: FAMILY MEDICINE

## 2018-03-15 PROCEDURE — 99213 OFFICE O/P EST LOW 20 MIN: CPT | Performed by: FAMILY MEDICINE

## 2018-03-15 PROCEDURE — 80061 LIPID PANEL: CPT | Performed by: FAMILY MEDICINE

## 2018-03-15 PROCEDURE — 85025 COMPLETE CBC W/AUTO DIFF WBC: CPT | Performed by: FAMILY MEDICINE

## 2018-03-15 PROCEDURE — 84703 CHORIONIC GONADOTROPIN ASSAY: CPT | Performed by: FAMILY MEDICINE

## 2018-03-15 PROCEDURE — 36415 COLL VENOUS BLD VENIPUNCTURE: CPT | Performed by: FAMILY MEDICINE

## 2018-03-15 PROCEDURE — 88175 CYTOPATH C/V AUTO FLUID REDO: CPT | Performed by: FAMILY MEDICINE

## 2018-03-15 PROCEDURE — 99000 SPECIMEN HANDLING OFFICE-LAB: CPT | Performed by: FAMILY MEDICINE

## 2018-03-15 PROCEDURE — 87591 N.GONORRHOEAE DNA AMP PROB: CPT | Performed by: FAMILY MEDICINE

## 2018-03-15 PROCEDURE — 82607 VITAMIN B-12: CPT | Performed by: FAMILY MEDICINE

## 2018-03-15 PROCEDURE — 82306 VITAMIN D 25 HYDROXY: CPT | Performed by: FAMILY MEDICINE

## 2018-03-15 PROCEDURE — 80053 COMPREHEN METABOLIC PANEL: CPT | Performed by: FAMILY MEDICINE

## 2018-03-15 RX ORDER — AMOXICILLIN 500 MG/1
CAPSULE ORAL
Refills: 0 | COMMUNITY
Start: 2018-02-17 | End: 2018-03-15

## 2018-03-15 RX ORDER — LEVONORGESTREL AND ETHINYL ESTRADIOL 0.1-0.02MG
1 KIT ORAL
Qty: 28 TABLET | Refills: 11 | Status: SHIPPED | OUTPATIENT
Start: 2018-03-15 | End: 2019-01-05

## 2018-03-15 RX ORDER — BUSPIRONE HYDROCHLORIDE 15 MG/1
15 TABLET ORAL 2 TIMES DAILY
Qty: 60 TABLET | Refills: 5 | Status: SHIPPED | OUTPATIENT
Start: 2018-03-15 | End: 2018-06-19

## 2018-03-15 NOTE — PROGRESS NOTES
HPI:   Stalin Lawrence is a 23year old female who presents for a complete physical exam. Symptoms: periods every other month.  Patient complains of  Anxiety increased  Abnormal pap done 3/16 normal  Sexually active boyfriend no condoms    Last colonoscop 5   ClonazePAM 0.5 MG Oral Tab Take 0.5-1 tablets (0.25-0.5 mg total) by mouth 2 (two) times daily as needed for Anxiety. Disp: 60 tablet Rfl: 2   Vortioxetine HBr (TRINTELLIX) 10 MG Oral Tab Take 1 tablet (10 mg total) by mouth once daily.  Disp: 30 tablet tingling or dizziness  PSYCHE: denies depression or anxiety  HEMATOLOGIC: denies bleeding abnormalities  ENDOCRINE: denies temperature intolerance, polyuria, or excessive sweating.   LYMPHATICS: denies swollen glands      EXAM:   BP 90/70 (BP Location: Righ cervix  Screening for chlamydial disease  Screening for gonorrhea      Orders Placed This Encounter      HCG, BETA SUBUNIT, QUAL [8435] [Q]      mmm  B12      mmm cbc      mmm cmp      mmm vit d      mmm lipid      TdaP (Boostrix/Adacel) Vaccine (> 7 Y) Dispense: 60 tablet; Refill: 5    4. Major depressive disorder, recurrent, in full remission with anxious distress ([address]  Continue with Trintellix      5. Sebaceous cyst of labia  Warm compresses and follow-up in the office for incision and drainage.     6.

## 2018-03-15 NOTE — PROGRESS NOTES
Lab results showed low Vitamin D. Advise RX vitamin D 50,000 IU once weekly for 12 weeks. Recheck vitamin D level in 3 months. After labs we will direct you on the dose of vitamin D needed OTC.    pregnancy test is negative  Lipids are elevated saul spear

## 2018-03-16 LAB
C TRACH DNA SPEC QL NAA+PROBE: NEGATIVE
N GONORRHOEA DNA SPEC QL NAA+PROBE: NEGATIVE

## 2018-03-19 ENCOUNTER — TELEPHONE (OUTPATIENT)
Dept: FAMILY MEDICINE CLINIC | Facility: CLINIC | Age: 23
End: 2018-03-19

## 2018-03-19 DIAGNOSIS — E78.5 ELEVATED LIPIDS: ICD-10-CM

## 2018-03-19 DIAGNOSIS — E55.9 VITAMIN D DEFICIENCY: Primary | ICD-10-CM

## 2018-03-19 DIAGNOSIS — R74.8 ELEVATED ALKALINE PHOSPHATASE LEVEL: ICD-10-CM

## 2018-03-19 LAB — LAST PAP RESULT: NORMAL

## 2018-03-19 RX ORDER — ERGOCALCIFEROL 1.25 MG/1
50000 CAPSULE ORAL WEEKLY
Qty: 12 CAPSULE | Refills: 0 | Status: SHIPPED | OUTPATIENT
Start: 2018-03-19 | End: 2018-06-17

## 2018-03-19 NOTE — TELEPHONE ENCOUNTER
Per Griselda Chairez PA-C, the patient was informed that her gonorrhea and chlamydia tests were negative. Patient verbalized understanding. Per [facility], the patient was informed of her labs results via my chart.  Future labs were ordered and vitamin d pre

## 2018-03-19 NOTE — TELEPHONE ENCOUNTER
----- Message from Judy Carlson PA-C sent at 3/16/2018  4:33 PM CDT -----  Negative gonorrhea and chlamydia.

## 2018-06-19 PROBLEM — F33.1 DEPRESSION, MAJOR, RECURRENT, MODERATE (HCC): Status: ACTIVE | Noted: 2018-06-19

## 2018-06-20 ENCOUNTER — TELEPHONE (OUTPATIENT)
Dept: FAMILY MEDICINE CLINIC | Facility: CLINIC | Age: 23
End: 2018-06-20

## 2018-06-20 NOTE — TELEPHONE ENCOUNTER
The patient's completed \"FMLA or Leave of Absence Medical Certification Employee's Serious Health Condition\" form was successfully faxed.

## 2018-06-22 ENCOUNTER — TELEPHONE (OUTPATIENT)
Dept: FAMILY MEDICINE CLINIC | Facility: CLINIC | Age: 23
End: 2018-06-22

## 2018-06-22 NOTE — TELEPHONE ENCOUNTER
A \" [facility] Medical Certification - Additional Information Needed\" form was successfully faxed to [facility] at [phone].

## 2018-07-16 NOTE — PROGRESS NOTES
Patient is in today to discuss anxiety which she was scheduled for also wants a preop evaluation for scar revision on her neck to be done by Dr. Patricia Funk.     She was visually upset due to long wait to be seen today and that the preop request was not in t

## 2018-07-18 NOTE — TELEPHONE ENCOUNTER
From: Cirilo Anderson PA-C  To: Evette Gibbs  Sent: 7/16/2018 3:53 PM CDT  Subject: Today's appointment    Dear Will Rodriguez and I apologize for your experience today and our falling behind.  My goal in providing medical care is to always give

## 2018-08-06 ENCOUNTER — OFFICE VISIT (OUTPATIENT)
Dept: FAMILY MEDICINE CLINIC | Facility: CLINIC | Age: 23
End: 2018-08-06
Payer: COMMERCIAL

## 2018-08-06 VITALS
WEIGHT: 228 LBS | HEART RATE: 60 BPM | BODY MASS INDEX: 33.77 KG/M2 | HEIGHT: 68.86 IN | DIASTOLIC BLOOD PRESSURE: 70 MMHG | SYSTOLIC BLOOD PRESSURE: 98 MMHG

## 2018-08-06 DIAGNOSIS — F41.1 GAD (GENERALIZED ANXIETY DISORDER): ICD-10-CM

## 2018-08-06 DIAGNOSIS — F17.200 SMOKER: ICD-10-CM

## 2018-08-06 DIAGNOSIS — E89.0 HYPOTHYROIDISM ASSOCIATED WITH SURGICAL PROCEDURE: ICD-10-CM

## 2018-08-06 DIAGNOSIS — J30.1 SEASONAL ALLERGIC RHINITIS DUE TO POLLEN: ICD-10-CM

## 2018-08-06 DIAGNOSIS — Z71.6 ENCOUNTER FOR SMOKING CESSATION COUNSELING: ICD-10-CM

## 2018-08-06 DIAGNOSIS — L91.0 HYPERTROPHIC SCAR: Primary | ICD-10-CM

## 2018-08-06 DIAGNOSIS — F41.0 PANIC ATTACKS: ICD-10-CM

## 2018-08-06 DIAGNOSIS — F33.1 DEPRESSION, MAJOR, RECURRENT, MODERATE (HCC): ICD-10-CM

## 2018-08-06 DIAGNOSIS — E89.0 STATUS POST THYROIDECTOMY: ICD-10-CM

## 2018-08-06 DIAGNOSIS — E78.2 MIXED HYPERLIPIDEMIA: ICD-10-CM

## 2018-08-06 DIAGNOSIS — F50.89 PSYCHOGENIC VOMITING WITH NAUSEA: ICD-10-CM

## 2018-08-06 PROBLEM — N90.7 SEBACEOUS CYST OF LABIA: Status: RESOLVED | Noted: 2018-03-15 | Resolved: 2018-08-06

## 2018-08-06 PROCEDURE — 99243 OFF/OP CNSLTJ NEW/EST LOW 30: CPT | Performed by: FAMILY MEDICINE

## 2018-08-06 RX ORDER — MELOXICAM 15 MG/1
TABLET ORAL
Refills: 0 | COMMUNITY
Start: 2018-07-24 | End: 2018-08-06 | Stop reason: ALTCHOICE

## 2018-08-06 NOTE — H&P
Esteban Guzman is a 23year old female who presents for a pre-operative physical exam.   Patient presents with:  Pre-Op Exam: scar revision to be performed by Dr. Muhammad Nurse on 08/24/2018 @ [address]     Prior anesthesia  No issues  PM ne Anxiety. Disp: 60 tablet Rfl: 2   Vortioxetine HBr (TRINTELLIX) 10 MG Oral Tab Take 1 tablet (10 mg total) by mouth once daily.  Disp: 30 tablet Rfl: 5   AZELASTINE HCL 0.1 % Nasal Solution INHALE 1 SPRAY IN EACH NOSTRIL TWICE DAILY (Patient taking differen Sitting, Cuff Size: adult)   Pulse 60   Ht 68.86\"   Wt 228 lb   BMI 33.81 kg/m²   GENERAL: well developed, well nourished,in no apparent distress  SKIN: Hyperkeratotic skin throughout,no suspicious lesions  HEENT: atraumatic, normocephalic, right ear ceru anxiety disorder)  Encounter for smoking cessation counseling  Psychogenic vomiting with nausea    No orders of the defined types were placed in this encounter.       Meds & Refills for this Visit:    No prescriptions requested or ordered in this encounter

## 2018-08-07 ENCOUNTER — TELEPHONE (OUTPATIENT)
Dept: FAMILY MEDICINE CLINIC | Facility: CLINIC | Age: 23
End: 2018-08-07

## 2018-08-07 NOTE — TELEPHONE ENCOUNTER
The patient's Pre-op H&P was successfully faxed to Ximena Oh M.D. at [phone] and [facility] at [phone].

## 2018-08-14 DIAGNOSIS — F33.42 MAJOR DEPRESSIVE DISORDER, RECURRENT, IN FULL REMISSION WITH ANXIOUS DISTRESS (HCC): ICD-10-CM

## 2018-08-14 DIAGNOSIS — F50.89 PSYCHOGENIC VOMITING WITH NAUSEA: ICD-10-CM

## 2018-08-14 RX ORDER — ONDANSETRON HYDROCHLORIDE 8 MG/1
TABLET, FILM COATED ORAL
Qty: 30 TABLET | Refills: 2 | Status: SHIPPED | OUTPATIENT
Start: 2018-08-14 | End: 2018-08-20 | Stop reason: ALTCHOICE

## 2018-08-24 ENCOUNTER — TELEPHONE (OUTPATIENT)
Dept: FAMILY MEDICINE CLINIC | Facility: CLINIC | Age: 23
End: 2018-08-24

## 2018-08-24 PROCEDURE — 88305 TISSUE EXAM BY PATHOLOGIST: CPT | Performed by: OTOLARYNGOLOGY

## 2018-08-24 NOTE — TELEPHONE ENCOUNTER
[facility] called checking on refill status for Vortioxetine HBr (TRINTELLIX) 10 MG Oral they state they sent it 5 days ago

## 2018-08-27 NOTE — TELEPHONE ENCOUNTER
Tracie Amado has been discontinued as of 8/20/2018 by Dr Cindi Mackey  Asked patient to call office to confirm that she is no longer taking the trintellix

## 2018-08-30 ENCOUNTER — LABORATORY ENCOUNTER (OUTPATIENT)
Dept: LAB | Age: 23
End: 2018-08-30
Attending: FAMILY MEDICINE
Payer: COMMERCIAL

## 2018-08-30 ENCOUNTER — OFFICE VISIT (OUTPATIENT)
Dept: FAMILY MEDICINE CLINIC | Facility: CLINIC | Age: 23
End: 2018-08-30
Payer: COMMERCIAL

## 2018-08-30 VITALS
HEIGHT: 69.13 IN | HEART RATE: 72 BPM | BODY MASS INDEX: 33.97 KG/M2 | WEIGHT: 232 LBS | TEMPERATURE: 98 F | SYSTOLIC BLOOD PRESSURE: 94 MMHG | DIASTOLIC BLOOD PRESSURE: 62 MMHG

## 2018-08-30 DIAGNOSIS — R74.8 ELEVATED ALKALINE PHOSPHATASE LEVEL: ICD-10-CM

## 2018-08-30 DIAGNOSIS — R82.998 LEUKOCYTES IN URINE: ICD-10-CM

## 2018-08-30 DIAGNOSIS — R35.0 URINARY FREQUENCY: ICD-10-CM

## 2018-08-30 DIAGNOSIS — E55.9 VITAMIN D DEFICIENCY: ICD-10-CM

## 2018-08-30 DIAGNOSIS — R35.0 URINARY FREQUENCY: Primary | ICD-10-CM

## 2018-08-30 DIAGNOSIS — H53.9 VISUAL DISTURBANCE: ICD-10-CM

## 2018-08-30 LAB
ALBUMIN SERPL-MCNC: 3.2 G/DL (ref 3.5–4.8)
ALP LIVER SERPL-CCNC: 88 U/L (ref 37–98)
ALT SERPL-CCNC: 16 U/L (ref 14–54)
APPEARANCE: CLEAR
AST SERPL-CCNC: 8 U/L (ref 15–41)
BILIRUB DIRECT SERPL-MCNC: <0.1 MG/DL (ref 0.1–0.5)
BILIRUB SERPL-MCNC: 0.3 MG/DL (ref 0.1–2)
BILIRUBIN: NEGATIVE
EST. AVERAGE GLUCOSE BLD GHB EST-MCNC: 105 MG/DL (ref 68–126)
GLUCOSE (URINE DIPSTICK): NEGATIVE MG/DL
HBA1C MFR BLD HPLC: 5.3 % (ref ?–5.7)
KETONES (URINE DIPSTICK): NEGATIVE MG/DL
M PROTEIN MFR SERPL ELPH: 7.1 G/DL (ref 6.1–8.3)
MULTISTIX LOT#: ABNORMAL NUMERIC
NITRITE, URINE: NEGATIVE
OCCULT BLOOD: NEGATIVE
PH, URINE: 7 (ref 4.5–8)
PROTEIN (URINE DIPSTICK): NEGATIVE MG/DL
SPECIFIC GRAVITY: 1.02 (ref 1–1.03)
URINE-COLOR: YELLOW
UROBILINOGEN,SEMI-QN: 0.2 MG/DL (ref 0–1.9)
VIT D+METAB SERPL-MCNC: 20.4 NG/ML (ref 30–100)

## 2018-08-30 PROCEDURE — 87086 URINE CULTURE/COLONY COUNT: CPT | Performed by: FAMILY MEDICINE

## 2018-08-30 PROCEDURE — 81003 URINALYSIS AUTO W/O SCOPE: CPT | Performed by: FAMILY MEDICINE

## 2018-08-30 PROCEDURE — 83036 HEMOGLOBIN GLYCOSYLATED A1C: CPT | Performed by: FAMILY MEDICINE

## 2018-08-30 PROCEDURE — 99213 OFFICE O/P EST LOW 20 MIN: CPT | Performed by: FAMILY MEDICINE

## 2018-08-30 PROCEDURE — 80076 HEPATIC FUNCTION PANEL: CPT | Performed by: FAMILY MEDICINE

## 2018-08-30 PROCEDURE — 36415 COLL VENOUS BLD VENIPUNCTURE: CPT | Performed by: FAMILY MEDICINE

## 2018-08-30 PROCEDURE — 82306 VITAMIN D 25 HYDROXY: CPT | Performed by: FAMILY MEDICINE

## 2018-08-30 NOTE — PROGRESS NOTES
Venkatesh Thompson is a 23year old female. HPI:   Patient presents with symptoms of urinary frequency for 1.5 week urination frequently going once more at night and during day twice as much. .   Denies any urgency, dysuria, and suprapubic pressure.   No fev Types: Cigarettes     Quit date: 8/27/2018  Smokeless tobacco: Never Used                      Alcohol use: Yes              Comment: rarely        REVIEW OF SYSTEMS:   GENERAL HEALTH: no fevers, chills or body aches.   SKIN: denies any unusual skin le Future  - URINE CULTURE, ROUTINE is  The patient indicates understanding of these issues and agrees to the plan. Call if any worsening symptoms.

## 2018-08-31 ENCOUNTER — TELEPHONE (OUTPATIENT)
Dept: FAMILY MEDICINE CLINIC | Facility: CLINIC | Age: 23
End: 2018-08-31

## 2018-08-31 DIAGNOSIS — E55.9 VITAMIN D DEFICIENCY: Primary | ICD-10-CM

## 2018-08-31 RX ORDER — ERGOCALCIFEROL 1.25 MG/1
50000 CAPSULE ORAL WEEKLY
Qty: 12 CAPSULE | Refills: 0 | Status: SHIPPED | OUTPATIENT
Start: 2018-08-31 | End: 2018-11-17

## 2018-08-31 NOTE — PROGRESS NOTES
No diabetes hemoglobin A1c is normal.  Lab results showed low Vitamin D. Advise RX vitamin D 50,000 IU once weekly for 12 weeks. Recheck vitamin D level in 3 months. After labs we will direct you on the dose of vitamin D needed OTC.   Order future tests/m

## 2018-09-01 NOTE — PROGRESS NOTES
Final result urine culture is negative for infection. Please update us on your symptoms and if they are worsening or not improving we can do an ultrasound of pelvis and bladder.   Sent to my chart

## 2018-09-09 DIAGNOSIS — F41.1 GAD (GENERALIZED ANXIETY DISORDER): ICD-10-CM

## 2018-09-10 RX ORDER — CLONAZEPAM 0.5 MG/1
TABLET ORAL
Qty: 60 TABLET | Refills: 1 | Status: ON HOLD | OUTPATIENT
Start: 2018-09-10 | End: 2020-01-23

## 2018-10-22 ENCOUNTER — LAB ENCOUNTER (OUTPATIENT)
Dept: LAB | Age: 23
End: 2018-10-22
Attending: OTOLARYNGOLOGY
Payer: COMMERCIAL

## 2018-10-22 DIAGNOSIS — E07.9 THYROID DISORDER: ICD-10-CM

## 2018-10-22 DIAGNOSIS — E07.9 THYROID DYSFUNCTION: ICD-10-CM

## 2018-10-22 PROCEDURE — 84443 ASSAY THYROID STIM HORMONE: CPT

## 2018-10-22 PROCEDURE — 36415 COLL VENOUS BLD VENIPUNCTURE: CPT

## 2018-10-22 PROCEDURE — 84439 ASSAY OF FREE THYROXINE: CPT

## 2018-10-22 PROCEDURE — 83970 ASSAY OF PARATHORMONE: CPT

## 2018-10-22 PROCEDURE — 82310 ASSAY OF CALCIUM: CPT

## 2019-01-05 DIAGNOSIS — Z01.419 WELL FEMALE EXAM WITH ROUTINE GYNECOLOGICAL EXAM: ICD-10-CM

## 2019-01-06 RX ORDER — LEVONORGESTREL AND ETHINYL ESTRADIOL 0.1-0.02MG
KIT ORAL
Qty: 84 TABLET | Refills: 0 | Status: SHIPPED | OUTPATIENT
Start: 2019-01-06 | End: 2019-03-14

## 2019-03-14 DIAGNOSIS — Z01.419 WELL FEMALE EXAM WITH ROUTINE GYNECOLOGICAL EXAM: ICD-10-CM

## 2019-03-15 RX ORDER — LEVONORGESTREL AND ETHINYL ESTRADIOL 0.1-0.02MG
KIT ORAL
Qty: 28 TABLET | Refills: 0 | Status: SHIPPED | OUTPATIENT
Start: 2019-03-15 | End: 2019-04-04

## 2019-03-15 NOTE — TELEPHONE ENCOUNTER
Left message on cell to call the office and schedule a annual wellness visit for any further refills on [facility]

## 2019-04-04 ENCOUNTER — LAB ENCOUNTER (OUTPATIENT)
Dept: LAB | Age: 24
End: 2019-04-04
Attending: FAMILY MEDICINE
Payer: COMMERCIAL

## 2019-04-04 ENCOUNTER — OFFICE VISIT (OUTPATIENT)
Dept: FAMILY MEDICINE CLINIC | Facility: CLINIC | Age: 24
End: 2019-04-04
Payer: COMMERCIAL

## 2019-04-04 VITALS
WEIGHT: 231 LBS | SYSTOLIC BLOOD PRESSURE: 88 MMHG | DIASTOLIC BLOOD PRESSURE: 64 MMHG | HEIGHT: 68.66 IN | BODY MASS INDEX: 34.61 KG/M2 | HEART RATE: 80 BPM

## 2019-04-04 DIAGNOSIS — E55.9 VITAMIN D DEFICIENCY: ICD-10-CM

## 2019-04-04 DIAGNOSIS — Z12.4 SCREENING FOR MALIGNANT NEOPLASM OF CERVIX: ICD-10-CM

## 2019-04-04 DIAGNOSIS — Z00.00 LABORATORY EXAMINATION ORDERED AS PART OF A ROUTINE GENERAL MEDICAL EXAMINATION: ICD-10-CM

## 2019-04-04 DIAGNOSIS — E78.2 MIXED HYPERLIPIDEMIA: ICD-10-CM

## 2019-04-04 DIAGNOSIS — Z71.6 ENCOUNTER FOR SMOKING CESSATION COUNSELING: ICD-10-CM

## 2019-04-04 DIAGNOSIS — Z11.8 SCREENING FOR CHLAMYDIAL DISEASE: ICD-10-CM

## 2019-04-04 DIAGNOSIS — Z01.419 WELL FEMALE EXAM WITH ROUTINE GYNECOLOGICAL EXAM: ICD-10-CM

## 2019-04-04 DIAGNOSIS — F17.200 SMOKER: ICD-10-CM

## 2019-04-04 DIAGNOSIS — Z11.3 SCREENING FOR GONORRHEA: ICD-10-CM

## 2019-04-04 DIAGNOSIS — E55.9 VITAMIN D DEFICIENCY: Primary | ICD-10-CM

## 2019-04-04 PROCEDURE — 80053 COMPREHEN METABOLIC PANEL: CPT | Performed by: FAMILY MEDICINE

## 2019-04-04 PROCEDURE — 85025 COMPLETE CBC W/AUTO DIFF WBC: CPT | Performed by: FAMILY MEDICINE

## 2019-04-04 PROCEDURE — 87591 N.GONORRHOEAE DNA AMP PROB: CPT | Performed by: FAMILY MEDICINE

## 2019-04-04 PROCEDURE — 82306 VITAMIN D 25 HYDROXY: CPT | Performed by: FAMILY MEDICINE

## 2019-04-04 PROCEDURE — 99395 PREV VISIT EST AGE 18-39: CPT | Performed by: FAMILY MEDICINE

## 2019-04-04 PROCEDURE — 80061 LIPID PANEL: CPT | Performed by: FAMILY MEDICINE

## 2019-04-04 PROCEDURE — 36415 COLL VENOUS BLD VENIPUNCTURE: CPT | Performed by: FAMILY MEDICINE

## 2019-04-04 PROCEDURE — 88175 CYTOPATH C/V AUTO FLUID REDO: CPT | Performed by: FAMILY MEDICINE

## 2019-04-04 PROCEDURE — 87491 CHLMYD TRACH DNA AMP PROBE: CPT | Performed by: FAMILY MEDICINE

## 2019-04-04 RX ORDER — FLUOXETINE HYDROCHLORIDE 40 MG/1
1 CAPSULE ORAL DAILY
Refills: 0 | Status: ON HOLD | COMMUNITY
Start: 2019-02-14 | End: 2020-01-20

## 2019-04-04 RX ORDER — LEVONORGESTREL AND ETHINYL ESTRADIOL 0.1-0.02MG
1 KIT ORAL
Qty: 3 PACKAGE | Refills: 3 | Status: SHIPPED | OUTPATIENT
Start: 2019-04-04 | End: 2019-12-30

## 2019-04-04 RX ORDER — CLINDAMYCIN PHOSPHATE 10 MG/G
1 GEL TOPICAL DAILY PRN
Refills: 0 | Status: ON HOLD | COMMUNITY
Start: 2019-02-03 | End: 2020-01-23

## 2019-04-04 RX ORDER — BUSPIRONE HYDROCHLORIDE 30 MG/1
1 TABLET ORAL 2 TIMES DAILY
Refills: 1 | COMMUNITY
Start: 2019-02-14 | End: 2020-01-24 | Stop reason: ALTCHOICE

## 2019-04-04 NOTE — PATIENT INSTRUCTIONS
SCHEDULING [facility] APPOINTMENTS ONLINE    Lab appointments can now be scheduled online at [email or web address]    · Go to [email or web address]  · In Search type Lab  · Click \"Lab services\"  · Click \"Schedule Your Test Online\"  · Follow the prompts  · If you should have a mammogram.   Pap test: at least every 3 years if you have ever had sex and have not had your uterus removed.  Your healthcare provider may recommend more frequent screening if you have had any abnormalities in the past or if you have an increa do not get regular exercise). Osteoporosis is a disease that thins and weakens bones to the point where they break easily. Hearing test: if you are 72 or older   Vision test: if you are 72 or older.    Remember, these are the minimum recommendations for r hepatitis shot and for all adults who are at risk of infection.  This includes, for example, women who have more than 1 sex partner or whose partner has more than 1 partner, or who have a sexually transmitted infection, abuse IV drugs, or plan to travel whe partners. Hormone use: During or after menopause, discuss the risks and benefits of use of estrogen and progesterone replacement with your healthcare provider.    Osteoporosis prevention:  Advise 1,500 mg of calcium with 1,000 iu of vitamin D daily and da saving money. Keep this list and review it whenever you feel like smoking. Get support. Let your friends know you may call them to chat when you have an urge to smoke.   If Kimberly Lock tried to quit before without success, this time avoid the triggers that may

## 2019-04-04 NOTE — PROGRESS NOTES
HPI:   Dixie Marcial is a 20year old female who presents for a complete physical exam. Symptoms: denies discharge, itching, burning or dysuria, periods are regular. Patient complains of none at this time.    Abnormal pap Never last Pap 03/15/2018  Sexu 125 MCG Oral Tab Take 1 tablet (125 mcg total) by mouth once daily.  Disp: 90 tablet Rfl: 3   CLONAZEPAM 0.5 MG Oral Tab TAKE 1/2 TO 1 TABLET BY MOUTH TWICE DAILY AS NEEDED FOR ANXIETY Disp: 60 tablet Rfl: 1      Past Medical History:   Diagnosis Date   • C discomfort,  Has not had a period since January 2018 on continual birth control pills due to extreme mood swings with being on ASKER.   MUSCULOSKELETAL: denies joint pain or stiffness  NEURO: denies headaches, tingling or dizziness  PSYCHE: increased anx cervix  Screening for chlamydial disease  Screening for gonorrhea  Encounter for smoking cessation counseling  Smoker    Orders Placed This Encounter      mmm cbc      mmm cmp      mmm lipid      mmm vit d      *Specimen Handling      ThinPrep Pap with HPV WITH HPV REFLEX REQUEST B  - CHLAMYDIA/GONOCOCCUS, OSKAR    7. Screening for gonorrhea  - THINPREP PAP WITH HPV REFLEX REQUEST B; Future  - CHLAMYDIA/GONOCOCCUS, OSKAR;  Future  - THINPREP PAP WITH HPV REFLEX REQUEST B  - CHLAMYDIA/GONOCOCCUS, OSKAR  Working cess

## 2019-04-05 ENCOUNTER — TELEPHONE (OUTPATIENT)
Dept: FAMILY MEDICINE CLINIC | Facility: CLINIC | Age: 24
End: 2019-04-05

## 2019-04-05 DIAGNOSIS — E78.5 HYPERLIPIDEMIA, UNSPECIFIED HYPERLIPIDEMIA TYPE: Primary | ICD-10-CM

## 2019-04-05 RX ORDER — ERGOCALCIFEROL 1.25 MG/1
50000 CAPSULE ORAL WEEKLY
Qty: 12 CAPSULE | Refills: 0 | Status: SHIPPED | OUTPATIENT
Start: 2019-04-05 | End: 2019-07-04

## 2019-04-05 NOTE — TELEPHONE ENCOUNTER
----- Message from Leah Ku PA-C sent at 4/4/2019  9:03 PM CDT -----  Cholesterol is higher refer patient to dietitian. Repeat in 6 months if continues with elevation may need medication  Lab results showed low vitamin D.   Advise prescription vit

## 2019-04-05 NOTE — PROGRESS NOTES
Cholesterol is higher refer patient to dietitian. Repeat in 6 months if continues with elevation may need medication  Lab results showed low vitamin D. Advise prescription vitamin D 50,000 international units once weekly for 12 weeks.  After 3 months star

## 2019-04-10 ENCOUNTER — TELEPHONE (OUTPATIENT)
Dept: FAMILY MEDICINE CLINIC | Facility: CLINIC | Age: 24
End: 2019-04-10

## 2019-04-10 NOTE — TELEPHONE ENCOUNTER
----- Message from Radha Esparza PA-C sent at 4/6/2019  9:56 AM CDT -----  Chlamydia and gonorrhea testing are negative.     Pt informed via her Tutor [employer]

## 2019-07-25 ENCOUNTER — OFFICE VISIT (OUTPATIENT)
Dept: FAMILY MEDICINE CLINIC | Facility: CLINIC | Age: 24
End: 2019-07-25
Payer: COMMERCIAL

## 2019-07-25 VITALS
BODY MASS INDEX: 36.71 KG/M2 | DIASTOLIC BLOOD PRESSURE: 78 MMHG | WEIGHT: 245 LBS | HEART RATE: 82 BPM | HEIGHT: 68.66 IN | SYSTOLIC BLOOD PRESSURE: 100 MMHG

## 2019-07-25 DIAGNOSIS — L85.9 HYPERKERATOSIS OF SKIN: ICD-10-CM

## 2019-07-25 DIAGNOSIS — L72.9 SCALP CYST: Primary | ICD-10-CM

## 2019-07-25 DIAGNOSIS — F32.9 MAJOR DEPRESSION, CHRONIC: ICD-10-CM

## 2019-07-25 DIAGNOSIS — F41.1 GAD (GENERALIZED ANXIETY DISORDER): ICD-10-CM

## 2019-07-25 DIAGNOSIS — F41.0 PANIC ATTACKS: ICD-10-CM

## 2019-07-25 PROCEDURE — 99213 OFFICE O/P EST LOW 20 MIN: CPT | Performed by: FAMILY MEDICINE

## 2019-07-25 RX ORDER — FLUOXETINE HYDROCHLORIDE 20 MG/1
60 CAPSULE ORAL DAILY
Status: ON HOLD | COMMUNITY
End: 2020-01-23

## 2019-07-25 NOTE — PROGRESS NOTES
Emily Carrillo is a 20year old female. HPI:   Patient is in for follow-up on depression, generalized anxiety and panic attacks. Continues to see psychiatrist is on fluoxetine 60 mg and BuSpar twice a day with clonazepam twice a day.   Needs FMLA paper History:  Social History    Tobacco Use      Smoking status: Current Every Day Smoker        Packs/day: 0.25        Years: 7.00        Pack years: 1.75        Types: Cigarettes        Quit date: 2018        Years since quittin.9      Smokeless tob [address] [facility] treatment    2.  Hyperkeratosis of skin   Scalp cyst  To dermatology for further evaluation and removal of scalp and cyst and treatment of hyperkeratosis of skin  - DERM - INTERNAL    Time spent was 15 minutes more than 50% was spent on counseling regar

## 2019-07-25 NOTE — PROGRESS NOTES
The patient's completed \"FMLA or Leave of Absence Medical Certification Employee's Serious Health Condition\" form was successfully faxed to [facility] at [phone].

## 2019-07-30 ENCOUNTER — TELEPHONE (OUTPATIENT)
Dept: FAMILY MEDICINE CLINIC | Facility: CLINIC | Age: 24
End: 2019-07-30

## 2019-07-30 NOTE — TELEPHONE ENCOUNTER
Fwd to [facility] To complete. Copy of forms to be sent to scanning when complete.

## 2019-07-30 NOTE — TELEPHONE ENCOUNTER
Patient called stating she was notified by her FMLA that the dates of FMLA were missing and patient would like form filled out and the following dates should be starting with 7/18/19-7/17/2020.

## 2019-07-30 NOTE — TELEPHONE ENCOUNTER
We received a form that needs to be filled out by [place] [employer] [facility] For additional information for [facility] certification. I put the paperwork in the folder on Vanessa's desk.

## 2019-07-31 NOTE — TELEPHONE ENCOUNTER
The signed addendum to the patient's \"FMLA leave request\" form was successfully faxed to the attention of Ayan Li at [facility] at fax number [phone].

## 2019-08-19 NOTE — PROGRESS NOTES
Chronic Disease Management Services  Heart Failure Clinic Progress Note    Ruben Fernandez is a 73 year old year old Black/ male presenting for a follow-up visit for HFrEF. Patient’s cardiologist is not yest established, last seen n/a. Patient’s primary care provider is Quique Crystal seen 07/10/19.    Cardiac History:   1. HFrEF  2. HTN  3. Pulmonary embolism  4. H/o LV thrombus?  5. COPD     Visit History:  04/15/19 - NYHA FC II. No changes made   03/21/19 (PCP) - Referred to HF clinic    ER/Hospitalization History:   Last hospitalization per patients in early 2018    Clinic Weights:  Wt Readings from Last 6 Encounters:   08/19/19 74.7 kg   08/12/19 75.3 kg   07/10/19 76.6 kg   04/15/19 77.4 kg   03/25/19 77.5 kg   03/21/19 76.8 kg       Feels well overall and is w/o complaints.     CHF         HF Symptom Assessment:   • Dizziness/lightheadedness: rare, while changing positions  • Shortness of breath: dyspnea on exertion  • Functional capacity: stable, 1-2 blocks  • Cough: denies  • Chest pain: denies  • Orthopnea: stable, 2 pillow  • PND: denies, denies  • LE edema: denies, denies, does not use compression stockings  • GI: denies nausea, denies abdominal distention, denies early satiety, denies lack of appetite and denies bloating, ascites is not present    The patient has no AICD present for N/A prevention. The patient has not experienced ICD firing since the last visit. The patient does not have GUIDO. The patient is adherent to CPAP therapy: None of the time    Home Monitoring/Diet/Exercise:  • Daily weights: nonadherent, does not check  • Blood pressure monitoring: nonadherent, readings are does not check  • Sodium restriction: adherent  • Fluid restriction: adherent, 64 ounces  • Exercise: light exercise    Patient's medications, allergies, past medical, surgical, social and family histories were reviewed and updated as appropriate.     Current Outpatient Medications   Medication Sig  Nusrat Puentes is a 23year old female. HPI:   She does not get FMLA forms filled out for anxiety, panic disorder also in for follow-up on depressive symptoms.   Last time patient was and she was doing pretty well this time she states that the depressio Dispense Refill   • warfarin (COUMADIN) 2.5 MG tablet TAKE 2 TABLETS BY MOUTH DAILY 60 tablet 0   • spironolactone (ALDACTONE) 25 MG tablet Take 1 tablet by mouth daily. 90 tablet 1   • metoPROLOL tartrate (LOPRESSOR) 50 MG tablet Take 1 tablet by mouth 2 times daily. 180 tablet 1   • furosemide (LASIX) 80 MG tablet Take 1 tablet by mouth daily. 90 tablet 1   • enalapril (VASOTEC) 10 MG tablet Take 1 tablet by mouth daily. 90 tablet 1   • atorvastatin (LIPITOR) 80 MG tablet Take 1 tablet by mouth at bedtime. 90 tablet 1   • aspirin 81 MG chewable tablet Chew 1 tablet by mouth daily. 90 tablet 1   • budesonide/formoterol (SYMBICORT) 80-4.5 MCG/ACT inhaler Inhale 2 puffs into the lungs 2 times daily. 2 puff by mouth  Twice a day . 10.2 g 3   • albuterol 108 (90 Base) MCG/ACT inhaler Inhale 2 puffs into the lungs every 4 hours as needed for Shortness of Breath or Wheezing. 1 Inhaler 3     No current facility-administered medications for this visit.        Medication adherence:   Medications reconciled per medication bottles. The patient did take his medications today.      VITALS:   BP Readings from Last 3 Encounters:   08/19/19 110/60   08/12/19 147/75   07/10/19 150/56     Pulse Readings from Last 3 Encounters:   08/19/19 60   08/12/19 58   07/10/19 57     Wt Readings from Last 3 Encounters:   08/19/19 74.7 kg   08/12/19 75.3 kg   07/10/19 76.6 kg       LABS:  No results found for: NTPROBNP    Lab Results   Component Value Date    SODIUM 139 03/25/2019    CHLORIDE 102 03/25/2019    CO2 29 03/25/2019    POTASSIUM 4.8 03/25/2019    BUN 11 03/25/2019    CREATININE 1.18 (H) 03/25/2019    GLUCOSE 82 03/25/2019       Lab Results   Component Value Date    WBC 3.8 (L) 03/25/2019    HCT 47.0 03/25/2019    HGB 14.6 03/25/2019     03/25/2019       PERTINENT EXTERNAL LABS:  Labs from [facility] 11/19/18  Na 137; K 4.6; BUN 16; Scr 1.2; Hgb 14.7    PERTINENT CARDIODIAGNOSTICS:  TTE 08/12/19 - EF 45-50%; Grade 1  for you to do your work, take care of things at home, or get along with other people?  Somewhat difficult     No period for 6 months on OCP but gets irritable during the placebo is wondering if she can do continual birth control to avoid the irritable phase DD    EF 10% in early 2018?    ASSESSMENT/PLAN:    HFrEF  Stage C, NYHA FC II   Compensated  and euvolemic  Etiology unknown  - Last TTE from 08/12/19 shows EF 45-50%; Previous unavailable though outside notes mention EF 10% in 2018  - Current HF regimen: metoprolol tartrate 50 mg BID, enalapril 10 mg daily, spironolactone 25 mg daily, furosemide 80 mg daily   - Will continue current HF regimen for now    Hypertension  Goal BP <140/90 due to age and comorbidities  - BP today is at goal  - Current HTN regimen includes, HF medications listed above.  - Recommend same as HF plan     Pulmonary embolism  - H/o PE per available records  - On OAC with warfarin; INR subtherapeutic at 1.7 today  - Warfarin dose-adjusted to goal INR 2-3    H/o LV thrombus  - Noted on available records  - Repeat TTE without thrombus  - Anticoagulation plan as above     Counseling/education provided at today’s visit:   how to contact HF clinic  low sodium diet  perform daily weights  call clinic if any new symptoms of shortness of breath or leg swelling  call clinic if weight gain of 3 lbs in 7 days or less  call clinic if any new sypmtoms of dizziness or light headedness  fluid restriction 64 ounces    Labs/diagnostics due:   Repeat BMP at next visit and every 3 months     FOLLOW-UP  The patient will return to HF clinic in 09/2019    TIME SPENT ON VISIT  30 minutes were spent in face-to-face discussion related to the medical management of the patient.       Jarrett Lee (Dan), PharmD, BCPS  Clinical Pharmacy Specialist - Heart Failure, Cardiology, and Anticoagulation   [facility] [address] [address]  [email or web address]  P) [phone]     denies headaches  Musculoskeletal: No motor deficits  Psych see above  EXAM:   BP 90/70 (BP Location: Left arm, Patient Position: Sitting, Cuff Size: adult)   Pulse 80   Ht 69.37\"   Wt 224 lb   BMI 32.73 kg/m²   GENERAL: well developed, well nourished,in tablet (15 mg total) by mouth 3 (three) times daily. Dispense: 90 tablet; Refill: 5    4. Medication management   OCP continual due to mood   The patient indicates understanding of these issues and agrees to the plan.   The patient is asked to return in 1

## 2019-12-29 DIAGNOSIS — Z01.419 WELL FEMALE EXAM WITH ROUTINE GYNECOLOGICAL EXAM: ICD-10-CM

## 2019-12-30 RX ORDER — LEVONORGESTREL AND ETHINYL ESTRADIOL 0.1-0.02MG
KIT ORAL
Qty: 84 TABLET | Refills: 1 | Status: SHIPPED | OUTPATIENT
Start: 2019-12-30 | End: 2020-05-04

## 2019-12-30 NOTE — TELEPHONE ENCOUNTER
Pt requesting refill of AVIANE 0.1-20 MG-MCG Oral Tab    Passed protocol, refill approved, sent to pharmacy

## 2020-01-09 ENCOUNTER — LAB ENCOUNTER (OUTPATIENT)
Dept: LAB | Age: 25
End: 2020-01-09
Attending: OTOLARYNGOLOGY
Payer: COMMERCIAL

## 2020-01-09 DIAGNOSIS — E07.9 DISORDER OF THYROID GLAND: ICD-10-CM

## 2020-01-09 LAB
T4 FREE SERPL-MCNC: 1.1 NG/DL (ref 0.8–1.7)
TSI SER-ACNC: 0.9 MIU/ML (ref 0.36–3.74)

## 2020-01-09 PROCEDURE — 84439 ASSAY OF FREE THYROXINE: CPT

## 2020-01-09 PROCEDURE — 36415 COLL VENOUS BLD VENIPUNCTURE: CPT

## 2020-01-09 PROCEDURE — 84443 ASSAY THYROID STIM HORMONE: CPT

## 2020-01-13 NOTE — PROGRESS NOTES
Sahara, your thyroid levels are normal. We will plan to repeat those in 1 year. An order has been placed. Please call the office with any questions.

## 2020-01-20 ENCOUNTER — HOSPITAL ENCOUNTER (EMERGENCY)
Facility: HOSPITAL | Age: 25
Discharge: NURSING, REHAB OR LONG-TERM CARE | End: 2020-01-20
Attending: EMERGENCY MEDICINE
Payer: COMMERCIAL

## 2020-01-20 VITALS
TEMPERATURE: 98 F | RESPIRATION RATE: 22 BRPM | SYSTOLIC BLOOD PRESSURE: 98 MMHG | BODY MASS INDEX: 33 KG/M2 | OXYGEN SATURATION: 100 % | DIASTOLIC BLOOD PRESSURE: 69 MMHG | WEIGHT: 220 LBS | HEART RATE: 97 BPM

## 2020-01-20 DIAGNOSIS — T50.902A INTENTIONAL DRUG OVERDOSE, INITIAL ENCOUNTER (HCC): ICD-10-CM

## 2020-01-20 DIAGNOSIS — R45.851 SUICIDAL IDEATION: ICD-10-CM

## 2020-01-20 DIAGNOSIS — F32.A DEPRESSION, UNSPECIFIED DEPRESSION TYPE: Primary | ICD-10-CM

## 2020-01-20 PROBLEM — F33.2 MAJOR DEPRESSIVE DISORDER, RECURRENT EPISODE, SEVERE (HCC): Status: ACTIVE | Noted: 2020-01-20

## 2020-01-20 LAB
ALBUMIN SERPL-MCNC: 3.3 G/DL (ref 3.4–5)
ALBUMIN/GLOB SERPL: 0.8 {RATIO} (ref 1–2)
ALP LIVER SERPL-CCNC: 109 U/L (ref 37–98)
ALT SERPL-CCNC: 15 U/L (ref 13–56)
AMPHET UR QL SCN: NEGATIVE
ANION GAP SERPL CALC-SCNC: 8 MMOL/L (ref 0–18)
APAP SERPL-MCNC: <2 UG/ML (ref 10–30)
AST SERPL-CCNC: 9 U/L (ref 15–37)
ATRIAL RATE: 94 BPM
BARBITURATES UR QL SCN: NEGATIVE
BASOPHILS # BLD AUTO: 0.05 X10(3) UL (ref 0–0.2)
BASOPHILS NFR BLD AUTO: 0.5 %
BENZODIAZ UR QL SCN: NEGATIVE
BILIRUB SERPL-MCNC: 0.4 MG/DL (ref 0.1–2)
BUN BLD-MCNC: 12 MG/DL (ref 7–18)
BUN/CREAT SERPL: 12.1 (ref 10–20)
CALCIUM BLD-MCNC: 9.4 MG/DL (ref 8.5–10.1)
CANNABINOIDS UR QL SCN: NEGATIVE
CHLORIDE SERPL-SCNC: 111 MMOL/L (ref 98–112)
CO2 SERPL-SCNC: 19 MMOL/L (ref 21–32)
COCAINE UR QL: NEGATIVE
CREAT BLD-MCNC: 0.99 MG/DL (ref 0.55–1.02)
CREAT UR-SCNC: 143 MG/DL
DEPRECATED RDW RBC AUTO: 41.2 FL (ref 35.1–46.3)
EOSINOPHIL # BLD AUTO: 0.24 X10(3) UL (ref 0–0.7)
EOSINOPHIL NFR BLD AUTO: 2.2 %
ERYTHROCYTE [DISTWIDTH] IN BLOOD BY AUTOMATED COUNT: 13.2 % (ref 11–15)
ETHANOL SERPL-MCNC: <3 MG/DL (ref ?–3)
ETHANOL UR-MCNC: NEGATIVE MG/DL
GLOBULIN PLAS-MCNC: 4.4 G/DL (ref 2.8–4.4)
GLUCOSE BLD-MCNC: 141 MG/DL (ref 70–99)
HAV IGM SER QL: 1.8 MG/DL (ref 1.6–2.6)
HCT VFR BLD AUTO: 41.5 % (ref 35–48)
HGB BLD-MCNC: 13.6 G/DL (ref 12–16)
IMM GRANULOCYTES # BLD AUTO: 0.04 X10(3) UL (ref 0–1)
IMM GRANULOCYTES NFR BLD: 0.4 %
LYMPHOCYTES # BLD AUTO: 2.48 X10(3) UL (ref 1–4)
LYMPHOCYTES NFR BLD AUTO: 22.6 %
M PROTEIN MFR SERPL ELPH: 7.7 G/DL (ref 6.4–8.2)
MCH RBC QN AUTO: 27.9 PG (ref 26–34)
MCHC RBC AUTO-ENTMCNC: 32.8 G/DL (ref 31–37)
MCV RBC AUTO: 85.2 FL (ref 80–100)
MONOCYTES # BLD AUTO: 0.59 X10(3) UL (ref 0.1–1)
MONOCYTES NFR BLD AUTO: 5.4 %
NEUTROPHILS # BLD AUTO: 7.57 X10 (3) UL (ref 1.5–7.7)
NEUTROPHILS # BLD AUTO: 7.57 X10(3) UL (ref 1.5–7.7)
NEUTROPHILS NFR BLD AUTO: 68.9 %
OPIATES UR QL SCN: NEGATIVE
OSMOLALITY SERPL CALC.SUM OF ELEC: 288 MOSM/KG (ref 275–295)
P AXIS: 4 DEGREES
P-R INTERVAL: 136 MS
PCP UR QL SCN: NEGATIVE
PLATELET # BLD AUTO: 352 10(3)UL (ref 150–450)
POCT LOT NUMBER: NORMAL
POCT URINE PREGNANCY: NEGATIVE
POTASSIUM SERPL-SCNC: 3.5 MMOL/L (ref 3.5–5.1)
Q-T INTERVAL: 348 MS
QRS DURATION: 80 MS
QTC CALCULATION (BEZET): 435 MS
R AXIS: 35 DEGREES
RBC # BLD AUTO: 4.87 X10(6)UL (ref 3.8–5.3)
SALICYLATES SERPL-MCNC: <1.7 MG/DL (ref 2.8–20)
SODIUM SERPL-SCNC: 138 MMOL/L (ref 136–145)
T AXIS: 18 DEGREES
VENTRICULAR RATE: 94 BPM
WBC # BLD AUTO: 11 X10(3) UL (ref 4–11)

## 2020-01-20 PROCEDURE — 83735 ASSAY OF MAGNESIUM: CPT | Performed by: EMERGENCY MEDICINE

## 2020-01-20 PROCEDURE — 81025 URINE PREGNANCY TEST: CPT

## 2020-01-20 PROCEDURE — 96361 HYDRATE IV INFUSION ADD-ON: CPT

## 2020-01-20 PROCEDURE — 93010 ELECTROCARDIOGRAM REPORT: CPT

## 2020-01-20 PROCEDURE — 99285 EMERGENCY DEPT VISIT HI MDM: CPT

## 2020-01-20 PROCEDURE — 80329 ANALGESICS NON-OPIOID 1 OR 2: CPT | Performed by: EMERGENCY MEDICINE

## 2020-01-20 PROCEDURE — 93005 ELECTROCARDIOGRAM TRACING: CPT

## 2020-01-20 PROCEDURE — 96374 THER/PROPH/DIAG INJ IV PUSH: CPT

## 2020-01-20 PROCEDURE — 85025 COMPLETE CBC W/AUTO DIFF WBC: CPT | Performed by: EMERGENCY MEDICINE

## 2020-01-20 PROCEDURE — 80053 COMPREHEN METABOLIC PANEL: CPT | Performed by: EMERGENCY MEDICINE

## 2020-01-20 PROCEDURE — 80320 DRUG SCREEN QUANTALCOHOLS: CPT | Performed by: EMERGENCY MEDICINE

## 2020-01-20 PROCEDURE — 80307 DRUG TEST PRSMV CHEM ANLYZR: CPT | Performed by: EMERGENCY MEDICINE

## 2020-01-20 RX ORDER — ONDANSETRON 2 MG/ML
4 INJECTION INTRAMUSCULAR; INTRAVENOUS ONCE
Status: DISCONTINUED | OUTPATIENT
Start: 2020-01-20 | End: 2020-01-20

## 2020-01-20 RX ORDER — ONDANSETRON 2 MG/ML
4 INJECTION INTRAMUSCULAR; INTRAVENOUS ONCE
Status: COMPLETED | OUTPATIENT
Start: 2020-01-20 | End: 2020-01-20

## 2020-01-20 NOTE — ED PROVIDER NOTES
Patient Seen in: [facility] Emergency Department      History   Patient presents with:  Eval-P    Stated Complaint: evalp    HPI    This is a 20-year-old female who arrives here for a psych evaluation and overdose.   The patient states that she has b negative except as noted above.     Physical Exam     ED Triage Vitals   BP 01/20/20 1339 121/89   Pulse 01/20/20 1339 90   Resp 01/20/20 1339 18   Temp 01/20/20 1339 97.8 °F (36.6 °C)   Temp src 01/20/20 1339 Temporal   SpO2 01/20/20 1339 99 %   O2 Device were created for panel order CBC WITH DIFFERENTIAL WITH PLATELET.   Procedure                               Abnormality         Status                     ---------                               -----------         ------                     CBC W/ DIFFERCORBY specified.       Medications Prescribed:  Current Discharge Medication List

## 2020-01-21 PROBLEM — E66.9 OBESITY: Status: ACTIVE | Noted: 2020-01-21

## 2020-01-21 PROBLEM — Z30.41 ORAL CONTRACEPTIVE USE: Status: ACTIVE | Noted: 2020-01-21

## 2020-01-21 NOTE — BH LEVEL OF CARE ASSESSMENT
Level of Care Assessment Note    General Questions  Why are you here?: \"I had a suicide attempt\"  Precipitating Events: Pt reports she has been having a pretty rough 4 months. She states a few months ago she tried [address] therapy.  She states within the past were dead or wished you could go to sleep and not wake up? (past 30 days): Yes  2. Have you actually had any thoughts of killing yourself? (past 30 days): Yes  3. Have you been thinking about how you might kill yourself? (past 30 days): Yes  4.  Have you ha No  Collateral for any access to means/firearms/weapons: Pt denies access to firearms/weapons     Self Injury  History of Self Injurious Behaviors: Yes  Date of Past Occurence: (2 years ago)  Describe Past Self-Injurious Behaviors: Pt reports a hx of SIB w Current/Previous MH/CD Providers  Hospitalizations, Placements, Therapy, Detox: Yes  Current OP Psychiatrist  Current OP Psychiatrist: Ash Burdick ([facility] for personal development)  Dates of Oliver Functional Ability: Other (comment)(Pt denies )  Do you have any prior/current legal concerns?: None  History of Gang Involvement: No  Type of Residence: Private residence(Lives with boyfriend)    Abuse Assessment  Physical Abuse: Denies  Verbal Abuse: Nicole Barlow depression and passive SI that have worsened within the past 2 weeks. Pt's CRSS score is a 13. Pt denies any current or past hx of HI. She states she has a hx of SIB by cutting; with the last occurrence being 2 years ago.  Pt reports she drinks socially, bu

## 2020-01-21 NOTE — ED NOTES
Pt tearful and anxious about going to [facility], pt offered ativan PO at this time, pt refusing medication at this time. EMS here to take patient at this time.

## 2020-01-23 PROBLEM — F33.2 MAJOR DEPRESSIVE DISORDER, RECURRENT EPISODE, SEVERE (HCC): Status: RESOLVED | Noted: 2020-01-20 | Resolved: 2020-01-23

## 2020-05-04 DIAGNOSIS — Z01.419 WELL FEMALE EXAM WITH ROUTINE GYNECOLOGICAL EXAM: ICD-10-CM

## 2020-05-04 RX ORDER — LEVONORGESTREL AND ETHINYL ESTRADIOL 0.1-0.02MG
KIT ORAL
Qty: 84 TABLET | Refills: 1 | Status: SHIPPED | OUTPATIENT
Start: 2020-05-04 | End: 2020-07-06

## 2020-05-04 NOTE — TELEPHONE ENCOUNTER
Pt requesting refill of AVIANE 0.1-20 MG-MCG Oral Tab    Passed protocol, refill approved, sent to pharmacy.

## 2020-07-03 ENCOUNTER — PATIENT MESSAGE (OUTPATIENT)
Dept: FAMILY MEDICINE CLINIC | Facility: CLINIC | Age: 25
End: 2020-07-03

## 2020-07-06 DIAGNOSIS — Z01.419 WELL FEMALE EXAM WITH ROUTINE GYNECOLOGICAL EXAM: ICD-10-CM

## 2020-07-06 RX ORDER — LEVONORGESTREL AND ETHINYL ESTRADIOL 0.1-0.02MG
1 KIT ORAL DAILY
Qty: 84 TABLET | Refills: 2 | Status: SHIPPED | OUTPATIENT
Start: 2020-07-06 | End: 2021-03-25

## 2020-07-06 NOTE — TELEPHONE ENCOUNTER
From: Dora Gibbs  To: Luis Boykin PA-C  Sent: 7/3/2020 1:57 PM CDT  Subject: Non-Urgent Medical Question    Hello,    I am going to need to have my FMLA paperwork filled out this month for the year.  Would you recommend an appointment or just h

## 2020-07-12 NOTE — TELEPHONE ENCOUNTER
----- Message from Curtis Caraballo PA-C sent at 3/15/2018  5:43 PM CDT -----  Lab results showed low Vitamin D. [facility] vitamin D 50,000 IU once weekly for 12 weeks. Recheck vitamin D level in 3 months.   After labs we will direct you on the dose of vi atorvastatin 20 mg oral tablet: 1 tab(s) orally once a day (at bedtime)  folic acid 1 mg oral tablet: 1 tab(s) orally once a day  melatonin 3 mg oral tablet: 1 tab(s) orally once a day (at bedtime)  Multiple Vitamins oral tablet: 1 tab(s) orally once a day  pantoprazole 40 mg oral delayed release tablet: 1 tab(s) orally 2 times a day   thiamine 100 mg oral tablet: 1 tab(s) orally once a day

## 2020-07-27 NOTE — PROGRESS NOTES
Esteban Guzman is a 30year old female. HPI:     Patient is in the office for FMLA forms to be filled out.   Patient has a long history of major depression has been on multiple medications in the past is presently seeing a psychiatrist and talks to a co details of how to kill yourself? (past 30 days): No  5b. Do you intend to carry out this plan? (past 30 days): No  6. Have you ever done anything, started to do anything, or prepared to do anything to end your life? (lifetime): Yes  7.  How long ago did you 1 capsule by mouth daily. • Levonorgestrel-Ethinyl Estrad (AVIANE) 0.1-20 MG-MCG Oral Tab Take 1 tablet by mouth daily. 84 tablet 2   • clonazePAM 0.5 MG Oral Tab Take 1 tablet (0.5 mg total) by mouth nightly.  (Patient taking differently: Take 2 tablet GENERAL: well developed, well nourished,in no apparent distress  SKIN: Severe hyperkeratotic skin   HEENT: Deferred  EYES:  sclera clear without injection  NECK: supple,no adenopathy, no thyroid had thyroidectomy  LUNGS: clear to auscultation no rales, r counselor if she feels worse or to go to the emergency room. FMLA forms filled out she can have intermittent leave monthly as well as weekly time off for counseling. See FMLA forms for full details.   2. Laboratory examination ordered as part of a routine

## 2020-07-27 NOTE — PROGRESS NOTES
The patient's completed \"FMLA or Leave of Absence Medical Certification: Employee's Serious Health Condition\" form was successfully faxed to [facility] at [phone].

## 2020-07-28 PROBLEM — Z86.59 HISTORY OF SUICIDAL IDEATION: Status: ACTIVE | Noted: 2020-07-28

## 2020-07-28 PROBLEM — F33.1 DEPRESSION, MAJOR, RECURRENT, MODERATE (HCC): Status: RESOLVED | Noted: 2018-06-19 | Resolved: 2020-07-28

## 2020-07-28 PROBLEM — Z91.51 HISTORY OF SUICIDE ATTEMPT: Status: ACTIVE | Noted: 2020-07-28

## 2020-08-10 ENCOUNTER — TELEPHONE (OUTPATIENT)
Dept: FAMILY MEDICINE CLINIC | Facility: CLINIC | Age: 25
End: 2020-08-10

## 2020-08-10 NOTE — TELEPHONE ENCOUNTER
Spoke to patient. States she would like your advice on taking a short term disability absence from work. States she has been discussing this with her therapist for about a week. She feels her mental health is declining and would like to take the leave.

## 2020-08-10 NOTE — TELEPHONE ENCOUNTER
Patient has questions regarding VITOR, advised patient to make appointment and patient states she will make appointment after she asks the questions she needs to ask. She needs to know her and BODØ are on the same page.

## 2020-08-11 NOTE — TELEPHONE ENCOUNTER
Relayed recommendations, patient will get started on obtaining paperwork and make an appointment when she has it.

## 2020-08-20 ENCOUNTER — TELEPHONE (OUTPATIENT)
Dept: FAMILY MEDICINE CLINIC | Facility: CLINIC | Age: 25
End: 2020-08-20

## 2020-08-20 NOTE — TELEPHONE ENCOUNTER
Per Rashel Wilson PA-C, the patient's completed Work Note was successfully faxed to [employer] [facility]. at [phone], her completed \"Attending Physician Statement or Disability Claim\" form was successfully faxed to [address] at [phone], and her completed \

## 2020-08-20 NOTE — PROGRESS NOTES
Virtual Telephone Check-In    Nusrat Puentes verbally consents to a Virtual/Telephone Check-In visit on 08/20/20. Patient has been referred to the [facility] website at [email or web address] to review the yearly Consent to Treat document.     Patient under feels better and depression symptoms improve. She wants to try to work on herself and improve her sleep and eating while she is on disability. Patient is a long-term history of major depression since her early teenage years.   Has been on multiple medicat mouth nightly. (Patient taking differently: Take 2 tablets (1 mg) by mouth in the morning and 1 tablet (0.5 mg) in the evening ) 15 tablet 1   • FLUoxetine HCl 20 MG Oral Cap Take 3 capsules (60 mg total) by mouth daily.  (Patient taking differently: Take 2 is alert and orientated x3.     Psych patient's mood is normal and communication skills are good    ASSESSMENT AND PLAN:   Panic attacks  (primary encounter diagnosis)  Severe episode of recurrent major depressive disorder, without psychotic features ([facility])

## 2020-08-25 ENCOUNTER — TELEPHONE (OUTPATIENT)
Dept: FAMILY MEDICINE CLINIC | Facility: CLINIC | Age: 25
End: 2020-08-25

## 2020-08-25 NOTE — TELEPHONE ENCOUNTER
Per Judy Carlson PA-C, the patient's completed \"Attending Physician Statement for Disability Claim\" form was successfully faxed to [facility]. - Benefit Assistant at [phone].

## 2020-09-11 ENCOUNTER — VIRTUAL PHONE E/M (OUTPATIENT)
Dept: FAMILY MEDICINE CLINIC | Facility: CLINIC | Age: 25
End: 2020-09-11
Payer: COMMERCIAL

## 2020-09-11 DIAGNOSIS — F41.0 PANIC ATTACKS: ICD-10-CM

## 2020-09-11 DIAGNOSIS — F32.A: Primary | ICD-10-CM

## 2020-09-11 DIAGNOSIS — F40.10 SOCIAL ANXIETY DISORDER: ICD-10-CM

## 2020-09-11 DIAGNOSIS — F41.1 GAD (GENERALIZED ANXIETY DISORDER): ICD-10-CM

## 2020-09-11 PROCEDURE — 99214 OFFICE O/P EST MOD 30 MIN: CPT | Performed by: FAMILY MEDICINE

## 2020-09-11 NOTE — PROGRESS NOTES
Virtual Telephone Check-In    Esteban Guzman verbally consents to a Virtual/Telephone Check-In visit on 09/11/20. Patient has been referred to the [facility] website at [email or web address] to review the yearly Consent to Treat document.     Patient under time with family and trying to face her anxiety issues straight on. Counseling has been helping. Her PHQ 9 last month was at a 9 it is at a 12 now.   Does state that she will get intermittent days where she does feel better and then some days where she fe appetite or overeating 2   Feeling bad about yourself - or that you are a failure or have let yourself or your family down 2   Trouble concentrating on things, such as reading the newspaper or watching television 1   Moving or speaking so slowly that other (30 mg total) by mouth 2 (two) times daily.  30 tablet 1   • LEVOTHYROXINE SODIUM 125 MCG Oral Tab TAKE ONE TABLET BY MOUTH DAILY 30 tablet 11      Past Medical History:   Diagnosis Date   • Candidiasis of vulva and vagina    • Disorder of thyroid    • Dry Refills for this Visit:  Requested Prescriptions      No prescriptions requested or ordered in this encounter       Imaging & Consults:   NAVIGATOR  1.  Prolonged depression  ELI (generalized anxiety disorder)  Social anxiety disorder  Panic attacks  Seco

## 2020-09-11 NOTE — PROGRESS NOTES
Per Bertis Litten, PA-C, the patient's completed Work Note was successfully faxed to [facility]. at [phone].

## 2020-09-15 ENCOUNTER — TELEPHONE (OUTPATIENT)
Dept: FAMILY MEDICINE CLINIC | Facility: CLINIC | Age: 25
End: 2020-09-15

## 2020-09-23 NOTE — TELEPHONE ENCOUNTER
On 09/15/2020, the patient's medical records from 8/11/2020 to 09/11/2020 were successfully faxed to the attention of Jose Seaman, STD  at [phone].

## 2020-09-25 NOTE — PROGRESS NOTES
Terese Mesa is a 30year old female. HPI:   Please note that the following visit was completed using two-way, real-time interactive audio and video communication.   This has been done in good tayo to provide continuity of care in the best interest o Little interest or pleasure in doing things 3   Feeling down, depressed, or hopeless 3   Trouble falling or staying asleep, or sleeping too much 1   Feeling tired or having little energy 1   Poor appetite or overeating 1   Feeling bad about yourself - or depressive reaction    • Visual impairment     glasses      Social History:  Social History    Tobacco Use      Smoking status: Former Smoker        Packs/day: 0.25        Years: 7.00        Pack years: 1.75        Types: Cigarettes        Quit date: 2/1/2 Panic attacks   Medication management  Patient is to keep appointment with psychiatrist on October 3. Counseling continue weekly.   Reviewed importance of adequate sleep, daily exercise, leaving the house daily, avoid naps during the day keep a better rout

## 2020-10-10 NOTE — PROGRESS NOTES
Mony Hill is a 30year old female. HPI:   Please note that the following visit was completed using two-way, real-time interactive audio and video communication.   This has been done in good tayo to provide continuity of care in the best interest o point go back full-time if there is any issues she will let me know afterwards. Has a follow-up with psychiatrist next Friday. Presently no suicidal ideations.   PHQ 9 and [place] suicide severity scale reviewed    PHQ9 FLOWSHEET 10/9/2020   Little inter ago  Score and Suggested Actions - Office Visit: 4 - Medium Risk: Consider calling with the patient to schedule [facility] at [phone] for a no cost same day appointment.  Also educate patient and support person to call [phone] or go to closest ED if patient w Used    Alcohol use: Yes      Frequency: Monthly or less      Drinks per session: 1 or 2      Binge frequency: Never      Comment: rarely    Drug use: Yes      Frequency: 2.0 times per week      Types: Cannabis       REVIEW OF SYSTEMS:   GENERAL HEALTH: fe Continue with weekly talks with counselor. Continue trying to maintain a positive mental attitude and to think of at least 3-4 positive things daily to focus on.   Note was given for her to return back to work on Wednesday versus Monday since it will jayesh

## 2020-11-21 ENCOUNTER — HOSPITAL ENCOUNTER (OUTPATIENT)
Age: 25
Discharge: HOME | End: 2020-11-21
Payer: COMMERCIAL

## 2020-11-21 VITALS
DIASTOLIC BLOOD PRESSURE: 81 MMHG | OXYGEN SATURATION: 98 % | TEMPERATURE: 99 F | RESPIRATION RATE: 20 BRPM | SYSTOLIC BLOOD PRESSURE: 126 MMHG | HEART RATE: 96 BPM

## 2020-11-21 DIAGNOSIS — R09.81 NASAL CONGESTION: Primary | ICD-10-CM

## 2020-11-21 PROCEDURE — 99213 OFFICE O/P EST LOW 20 MIN: CPT | Performed by: PHYSICIAN ASSISTANT

## 2020-11-21 NOTE — ED PROVIDER NOTES
Patient Seen in: [facility]      History   Patient presents with:  Testing    Stated Complaint: testing    HPI    Pleasant 41-year-old female. Medical history of thyroid dysfunction.   For the past 6 days the patient has had some fatigue, nancy [address] [facility]  [phone]                Medications Prescribed:  Current Discharge Medication List

## 2021-01-20 ENCOUNTER — PATIENT MESSAGE (OUTPATIENT)
Dept: FAMILY MEDICINE CLINIC | Facility: CLINIC | Age: 26
End: 2021-01-20

## 2021-01-20 DIAGNOSIS — Z01.419 WELL FEMALE EXAM WITH ROUTINE GYNECOLOGICAL EXAM: ICD-10-CM

## 2021-01-20 RX ORDER — LEVONORGESTREL AND ETHINYL ESTRADIOL 0.1-0.02MG
KIT ORAL
Qty: 84 TABLET | Refills: 0 | OUTPATIENT
Start: 2021-01-20

## 2021-01-20 NOTE — TELEPHONE ENCOUNTER
From: Rachana Gibbs  To: Jaylen Mcnamara PA-C  Sent: 1/20/2021 12:39 PM CST  Subject: Prescription Question    I sent for a refill on my birth control but it was denied. Can I know what I need to do to have my birth control refilled?     Thank you,

## 2021-01-20 NOTE — TELEPHONE ENCOUNTER
Requested Prescriptions     Refused Prescriptions Disp Refills   • AVIANE 0.1-20 MG-MCG Oral Tab [Pharmacy Med Name: Aviane 0.1-20 Mg-Mcg Tab Teva] 84 tablet 0     Sig: Take 1 tablet by mouth daily.      Refused By: Racheal Porter     Reason for Refusal: Trisha Echols

## 2021-01-21 NOTE — TELEPHONE ENCOUNTER
From: Hernán Gibbs  To: Jelly James PA-C  Sent: 1/20/2021 9:40 PM CST  Subject: Prescription Question    Hello    I was denied a refill for my birth control and was told I still had a refill left. Looking at my package I'm at 0 refills.  Can a pr

## 2021-03-03 ENCOUNTER — LAB ENCOUNTER (OUTPATIENT)
Dept: LAB | Age: 26
End: 2021-03-03
Payer: MEDICAID

## 2021-03-03 DIAGNOSIS — E07.9 THYROID DYSFUNCTION: ICD-10-CM

## 2021-03-03 LAB
T4 FREE SERPL-MCNC: 1 NG/DL (ref 0.8–1.7)
TSI SER-ACNC: 1.8 MIU/ML (ref 0.36–3.74)

## 2021-03-03 PROCEDURE — 84443 ASSAY THYROID STIM HORMONE: CPT

## 2021-03-03 PROCEDURE — 84439 ASSAY OF FREE THYROXINE: CPT

## 2021-03-03 PROCEDURE — 36415 COLL VENOUS BLD VENIPUNCTURE: CPT

## 2021-03-07 ENCOUNTER — IMMUNIZATION (OUTPATIENT)
Dept: LAB | Age: 26
End: 2021-03-07

## 2021-03-07 DIAGNOSIS — Z23 NEED FOR VACCINATION: Primary | ICD-10-CM

## 2021-03-07 PROCEDURE — 0011A COVID-19 MODERNA VACCINE: CPT

## 2021-03-07 PROCEDURE — 91301 COVID-19 MODERNA VACCINE: CPT

## 2021-03-25 DIAGNOSIS — Z01.419 WELL FEMALE EXAM WITH ROUTINE GYNECOLOGICAL EXAM: ICD-10-CM

## 2021-03-25 RX ORDER — LEVONORGESTREL AND ETHINYL ESTRADIOL 0.1-0.02MG
1 KIT ORAL DAILY
Qty: 84 TABLET | Refills: 0 | Status: SHIPPED | OUTPATIENT
Start: 2021-03-25 | End: 2021-05-22

## 2021-03-25 NOTE — TELEPHONE ENCOUNTER
Requested Prescriptions     Signed Prescriptions Disp Refills   • Levonorgestrel-Ethinyl Estrad (AVIANE) 0.1-20 MG-MCG Oral Tab 84 tablet 0     Sig: Take 1 tablet by mouth daily.      Authorizing Provider: Astrid Kennedy     Ordering User: Birdie Grider

## 2021-04-05 ENCOUNTER — APPOINTMENT (OUTPATIENT)
Dept: LAB | Age: 26
End: 2021-04-05
Attending: HOSPITALIST

## 2021-04-09 DIAGNOSIS — Z23 NEED FOR VACCINATION: ICD-10-CM

## 2021-04-10 ENCOUNTER — IMMUNIZATION (OUTPATIENT)
Dept: LAB | Age: 26
End: 2021-04-10

## 2021-04-10 DIAGNOSIS — Z23 NEED FOR VACCINATION: Primary | ICD-10-CM

## 2021-04-10 PROCEDURE — 91301 COVID-19 MODERNA VACCINE: CPT | Performed by: HOSPITALIST

## 2021-04-10 PROCEDURE — 0012A COVID-19 MODERNA VACCINE: CPT | Performed by: HOSPITALIST

## 2021-05-21 ENCOUNTER — OFFICE VISIT (OUTPATIENT)
Dept: FAMILY MEDICINE CLINIC | Facility: CLINIC | Age: 26
End: 2021-05-21
Payer: MEDICAID

## 2021-05-21 VITALS
TEMPERATURE: 97 F | HEIGHT: 68.9 IN | HEART RATE: 84 BPM | SYSTOLIC BLOOD PRESSURE: 88 MMHG | BODY MASS INDEX: 38.51 KG/M2 | DIASTOLIC BLOOD PRESSURE: 62 MMHG | WEIGHT: 260 LBS

## 2021-05-21 DIAGNOSIS — Z01.419 WELL FEMALE EXAM WITH ROUTINE GYNECOLOGICAL EXAM: Primary | ICD-10-CM

## 2021-05-21 DIAGNOSIS — Z12.4 SCREENING FOR MALIGNANT NEOPLASM OF CERVIX: ICD-10-CM

## 2021-05-21 DIAGNOSIS — L85.9 HYPERKERATOSIS OF SKIN: ICD-10-CM

## 2021-05-21 DIAGNOSIS — F17.200 SMOKER: ICD-10-CM

## 2021-05-21 PROCEDURE — 3074F SYST BP LT 130 MM HG: CPT | Performed by: FAMILY MEDICINE

## 2021-05-21 PROCEDURE — 88175 CYTOPATH C/V AUTO FLUID REDO: CPT | Performed by: FAMILY MEDICINE

## 2021-05-21 PROCEDURE — 3078F DIAST BP <80 MM HG: CPT | Performed by: FAMILY MEDICINE

## 2021-05-21 PROCEDURE — 99395 PREV VISIT EST AGE 18-39: CPT | Performed by: FAMILY MEDICINE

## 2021-05-21 PROCEDURE — 3008F BODY MASS INDEX DOCD: CPT | Performed by: FAMILY MEDICINE

## 2021-05-21 PROCEDURE — 96127 BRIEF EMOTIONAL/BEHAV ASSMT: CPT | Performed by: FAMILY MEDICINE

## 2021-05-21 NOTE — PROGRESS NOTES
HPI:   Mahin Soto is a 30year old female who presents for a complete physical exam. Symptoms: periods every other month.    Depression/ELI  Left her current job went to another job was a  for 1 week found another job and got laid off recent HCl 20 MG Oral Cap Take 1 capsule (20 mg total) by mouth daily.  30 capsule 0   • clonazePAM 0.5 MG Oral Tab Take a 1/2 tablet daily as needed (Patient taking differently: Take a 2 tablets (1 mg) in the morning and 1/2 tablet (.25 mg) nightly ) 15 tablet 0 vision changes  HEENT: denies upper respiratory symptoms  LUNGS: denies cough or shortness of breath with exertion  CHEST:  denies breast changes or pain  CARDIOVASCULAR: denies chest pain or tightness on exertion: no edema  VASCULAR: denies leg cramps  GI motor or sensory deficit.     ASSESSMENT AND PLAN:   Janice Randhawa is a 30year old female who presents for a complete physical exam.    Well female exam with routine gynecological exam  (primary encounter diagnosis)  Hyperkeratosis of skin  Smoker  Scr physical yearly.

## 2021-05-21 NOTE — PATIENT INSTRUCTIONS
Routine Healthcare for Women   Routine checkups can find treatable problems early. For many medical problems, early treatment can help prevent more serious complications. The value of checkups and how often you have them depend mainly on your age.  Your per family history of high cholesterol. • Colorectal cancer test: if you are 48 or older.  Recommended tests include a yearly test for blood in the stool, called the fecal occult blood test (FOBT) or fecal immunochemical test (FIT), and one of the following t personal and family medical history. Many other tests are often done at routine checkups, but there is no current evidence that they are helpful as routine screening tests for healthy women.  Examples of such tests are a CBC (complete blood count), thyroi a younger age if you have a high-risk medical condition, such as diabetes. • Varicella (chickenpox) if you have never had chickenpox. • Zoster (shingles) vaccine: if you are 50 or older. The vaccine can help prevent shingles.  It can also reduce the lisa

## 2021-05-22 DIAGNOSIS — Z01.419 WELL FEMALE EXAM WITH ROUTINE GYNECOLOGICAL EXAM: ICD-10-CM

## 2021-05-22 PROBLEM — F41.0 PANIC DISORDER WITHOUT AGORAPHOBIA: Status: ACTIVE | Noted: 2021-05-22

## 2021-05-22 PROBLEM — F33.1 MODERATE RECURRENT MAJOR DEPRESSION (HCC): Status: ACTIVE | Noted: 2019-04-04

## 2021-05-24 RX ORDER — LEVONORGESTREL AND ETHINYL ESTRADIOL 0.1-0.02MG
1 KIT ORAL DAILY
Qty: 84 TABLET | Refills: 3 | Status: SHIPPED | OUTPATIENT
Start: 2021-05-24

## 2021-08-10 ENCOUNTER — HOSPITAL ENCOUNTER (OUTPATIENT)
Age: 26
Discharge: HOME | End: 2021-08-10
Payer: MEDICAID

## 2021-08-10 VITALS
TEMPERATURE: 98 F | RESPIRATION RATE: 18 BRPM | DIASTOLIC BLOOD PRESSURE: 79 MMHG | OXYGEN SATURATION: 99 % | SYSTOLIC BLOOD PRESSURE: 129 MMHG | HEART RATE: 80 BPM

## 2021-08-10 DIAGNOSIS — Z20.822 LAB TEST NEGATIVE FOR COVID-19 VIRUS: ICD-10-CM

## 2021-08-10 DIAGNOSIS — J01.40 ACUTE PANSINUSITIS, RECURRENCE NOT SPECIFIED: ICD-10-CM

## 2021-08-10 DIAGNOSIS — R11.2 NAUSEA VOMITING AND DIARRHEA: Primary | ICD-10-CM

## 2021-08-10 DIAGNOSIS — R19.7 NAUSEA VOMITING AND DIARRHEA: Primary | ICD-10-CM

## 2021-08-10 DIAGNOSIS — R09.82 PND (POST-NASAL DRIP): ICD-10-CM

## 2021-08-10 LAB
B-HCG UR QL: NEGATIVE
POCT BILIRUBIN URINE: NEGATIVE
POCT BLOOD URINE: NEGATIVE
POCT GLUCOSE URINE: NEGATIVE MG/DL
POCT KETONE URINE: NEGATIVE MG/DL
POCT NITRITE URINE: NEGATIVE
POCT PH URINE: 7.5 (ref 5–8)
POCT PROTEIN URINE: NEGATIVE MG/DL
POCT SPECIFIC GRAVITY URINE: 1.02
POCT URINE COLOR: YELLOW
POCT UROBILINOGEN URINE: 0.2 MG/DL
SARS-COV-2 RNA RESP QL NAA+PROBE: NOT DETECTED

## 2021-08-10 PROCEDURE — 81025 URINE PREGNANCY TEST: CPT | Performed by: PHYSICIAN ASSISTANT

## 2021-08-10 PROCEDURE — U0002 COVID-19 LAB TEST NON-CDC: HCPCS | Performed by: PHYSICIAN ASSISTANT

## 2021-08-10 PROCEDURE — 99213 OFFICE O/P EST LOW 20 MIN: CPT | Performed by: PHYSICIAN ASSISTANT

## 2021-08-10 PROCEDURE — 81002 URINALYSIS NONAUTO W/O SCOPE: CPT | Performed by: PHYSICIAN ASSISTANT

## 2021-08-10 RX ORDER — AMOXICILLIN 875 MG/1
875 TABLET, COATED ORAL 2 TIMES DAILY
Qty: 20 TABLET | Refills: 0 | Status: SHIPPED | OUTPATIENT
Start: 2021-08-10 | End: 2021-08-20

## 2021-08-10 RX ORDER — ONDANSETRON 4 MG/1
4 TABLET, ORALLY DISINTEGRATING ORAL ONCE
Status: COMPLETED | OUTPATIENT
Start: 2021-08-10 | End: 2021-08-10

## 2021-08-10 RX ORDER — ONDANSETRON 4 MG/1
4 TABLET, ORALLY DISINTEGRATING ORAL EVERY 4 HOURS PRN
Qty: 20 TABLET | Refills: 0 | Status: SHIPPED | OUTPATIENT
Start: 2021-08-10

## 2021-08-10 NOTE — ED INITIAL ASSESSMENT (HPI)
Pt state she has has sinus pain and congestion for the past 2 days it has given her face pressure and nausea

## 2021-08-10 NOTE — ED PROVIDER NOTES
Patient Seen in: [address]      History   Patient presents with:  Sinus Problem    Stated Complaint: Sinus Infection    HPI/Subjective:   HPI    49-year-old female here with complaint of a several day history of sinus pain and press reviewed. All other systems reviewed and negative except as noted above.     Physical Exam     ED Triage Vitals [08/10/21 1342]   /79   Pulse 80   Resp 18   Temp 97.6 °F (36.4 °C)   Temp src    SpO2 99 %   O2 Device None (Room air)       Current: All other components within normal limits   POCT PREGNANCY URINE - Normal   RAPID SARS-COV-2 BY PCR - Normal   URINE CULTURE, ROUTINE                   MDM   Clinical Impression: sinusitis/PNDnausea/Covid negative  Course of Treatment: Drink plenty of f

## 2022-01-26 ENCOUNTER — TELEPHONE (OUTPATIENT)
Dept: FAMILY MEDICINE CLINIC | Facility: CLINIC | Age: 27
End: 2022-01-26

## 2022-01-27 NOTE — TELEPHONE ENCOUNTER
Per Yusuf Manriquez PA-C, the patient's completed \"Acknowledgement of Donor Participation in the Plasmapheresis Program\" form and Medication List were successfully faxed to [facility] at [phone].

## 2022-03-22 RX ORDER — LEVONORGESTREL AND ETHINYL ESTRADIOL 0.1-0.02MG
KIT ORAL
Qty: 84 TABLET | Refills: 3 | OUTPATIENT
Start: 2022-03-22

## 2022-04-05 RX ORDER — LEVONORGESTREL AND ETHINYL ESTRADIOL 0.1-0.02MG
1 KIT ORAL DAILY
Qty: 84 TABLET | Refills: 0 | Status: SHIPPED | OUTPATIENT
Start: 2022-04-05

## 2022-05-10 ENCOUNTER — TELEPHONE (OUTPATIENT)
Dept: FAMILY MEDICINE CLINIC | Facility: CLINIC | Age: 27
End: 2022-05-10

## 2022-05-10 NOTE — TELEPHONE ENCOUNTER
Patient requesting new order for labs never had done from 2020 and any other labs as needed before her ov 5/23

## 2022-05-23 ENCOUNTER — OFFICE VISIT (OUTPATIENT)
Dept: FAMILY MEDICINE CLINIC | Facility: CLINIC | Age: 27
End: 2022-05-23
Payer: MEDICAID

## 2022-05-23 VITALS
HEART RATE: 80 BPM | WEIGHT: 249 LBS | BODY MASS INDEX: 36.46 KG/M2 | TEMPERATURE: 97 F | DIASTOLIC BLOOD PRESSURE: 60 MMHG | SYSTOLIC BLOOD PRESSURE: 90 MMHG | HEIGHT: 69.17 IN

## 2022-05-23 DIAGNOSIS — Z13.220 LIPID SCREENING: ICD-10-CM

## 2022-05-23 DIAGNOSIS — Z13.228 SCREENING FOR ENDOCRINE, METABOLIC AND IMMUNITY DISORDER: ICD-10-CM

## 2022-05-23 DIAGNOSIS — Z30.41 ORAL CONTRACEPTIVE USE: ICD-10-CM

## 2022-05-23 DIAGNOSIS — Z13.0 SCREENING FOR ENDOCRINE, METABOLIC AND IMMUNITY DISORDER: ICD-10-CM

## 2022-05-23 DIAGNOSIS — Z00.00 WELL FEMALE EXAM WITHOUT GYNECOLOGICAL EXAM: Primary | ICD-10-CM

## 2022-05-23 DIAGNOSIS — Z13.29 SCREENING FOR ENDOCRINE, METABOLIC AND IMMUNITY DISORDER: ICD-10-CM

## 2022-05-23 DIAGNOSIS — L85.9 HYPERKERATOSIS OF SKIN: ICD-10-CM

## 2022-05-23 DIAGNOSIS — F33.1 MODERATE RECURRENT MAJOR DEPRESSION (HCC): ICD-10-CM

## 2022-05-23 DIAGNOSIS — E66.09 CLASS 2 OBESITY DUE TO EXCESS CALORIES WITHOUT SERIOUS COMORBIDITY WITH BODY MASS INDEX (BMI) OF 36.0 TO 36.9 IN ADULT: ICD-10-CM

## 2022-05-23 PROCEDURE — 3078F DIAST BP <80 MM HG: CPT | Performed by: FAMILY MEDICINE

## 2022-05-23 PROCEDURE — 3074F SYST BP LT 130 MM HG: CPT | Performed by: FAMILY MEDICINE

## 2022-05-23 PROCEDURE — 99395 PREV VISIT EST AGE 18-39: CPT | Performed by: FAMILY MEDICINE

## 2022-05-23 PROCEDURE — 3008F BODY MASS INDEX DOCD: CPT | Performed by: FAMILY MEDICINE

## 2022-05-23 RX ORDER — LEVOTHYROXINE SODIUM 137 UG/1
137 TABLET ORAL DAILY
COMMUNITY
Start: 2022-05-10

## 2022-05-23 RX ORDER — AMOXICILLIN 500 MG/1
500 CAPSULE ORAL 3 TIMES DAILY
COMMUNITY
Start: 2022-05-18

## 2022-05-23 RX ORDER — LEVONORGESTREL AND ETHINYL ESTRADIOL 0.1-0.02MG
1 KIT ORAL DAILY
Qty: 84 TABLET | Refills: 3 | Status: SHIPPED | OUTPATIENT
Start: 2022-05-23

## 2022-05-31 ENCOUNTER — LAB ENCOUNTER (OUTPATIENT)
Dept: LAB | Age: 27
End: 2022-05-31
Attending: FAMILY MEDICINE
Payer: MEDICAID

## 2022-05-31 ENCOUNTER — TELEPHONE (OUTPATIENT)
Dept: FAMILY MEDICINE CLINIC | Facility: CLINIC | Age: 27
End: 2022-05-31

## 2022-05-31 DIAGNOSIS — E78.2 MIXED HYPERLIPIDEMIA: Primary | ICD-10-CM

## 2022-05-31 DIAGNOSIS — R79.89 ABNORMAL CBC: ICD-10-CM

## 2022-05-31 DIAGNOSIS — R74.8 ELEVATED ALKALINE PHOSPHATASE LEVEL: ICD-10-CM

## 2022-05-31 DIAGNOSIS — Z13.220 LIPID SCREENING: Primary | ICD-10-CM

## 2022-05-31 LAB
ALBUMIN SERPL-MCNC: 3.3 G/DL (ref 3.4–5)
ALBUMIN/GLOB SERPL: 0.9 {RATIO} (ref 1–2)
ALP LIVER SERPL-CCNC: 125 U/L
ALT SERPL-CCNC: 15 U/L
ANION GAP SERPL CALC-SCNC: 8 MMOL/L (ref 0–18)
AST SERPL-CCNC: 12 U/L (ref 15–37)
BASOPHILS # BLD AUTO: 0.07 X10(3) UL (ref 0–0.2)
BASOPHILS NFR BLD AUTO: 0.6 %
BILIRUB SERPL-MCNC: 0.5 MG/DL (ref 0.1–2)
BUN BLD-MCNC: 9 MG/DL (ref 7–18)
CALCIUM BLD-MCNC: 9.2 MG/DL (ref 8.5–10.1)
CHLORIDE SERPL-SCNC: 110 MMOL/L (ref 98–112)
CHOLEST SERPL-MCNC: 238 MG/DL (ref ?–200)
CO2 SERPL-SCNC: 21 MMOL/L (ref 21–32)
CREAT BLD-MCNC: 0.91 MG/DL
EOSINOPHIL # BLD AUTO: 0.25 X10(3) UL (ref 0–0.7)
EOSINOPHIL NFR BLD AUTO: 2.1 %
ERYTHROCYTE [DISTWIDTH] IN BLOOD BY AUTOMATED COUNT: 12.5 %
FASTING PATIENT LIPID ANSWER: YES
FASTING STATUS PATIENT QL REPORTED: YES
GLOBULIN PLAS-MCNC: 3.8 G/DL (ref 2.8–4.4)
GLUCOSE BLD-MCNC: 84 MG/DL (ref 70–99)
HCT VFR BLD AUTO: 43.5 %
HDLC SERPL-MCNC: 30 MG/DL (ref 40–59)
HGB BLD-MCNC: 14.2 G/DL
IMM GRANULOCYTES # BLD AUTO: 0.07 X10(3) UL (ref 0–1)
IMM GRANULOCYTES NFR BLD: 0.6 %
LDLC SERPL CALC-MCNC: 175 MG/DL (ref ?–100)
LYMPHOCYTES # BLD AUTO: 2.85 X10(3) UL (ref 1–4)
LYMPHOCYTES NFR BLD AUTO: 23.7 %
MCH RBC QN AUTO: 29.3 PG (ref 26–34)
MCHC RBC AUTO-ENTMCNC: 32.6 G/DL (ref 31–37)
MCV RBC AUTO: 89.9 FL
MONOCYTES # BLD AUTO: 0.84 X10(3) UL (ref 0.1–1)
MONOCYTES NFR BLD AUTO: 7 %
NEUTROPHILS # BLD AUTO: 7.94 X10 (3) UL (ref 1.5–7.7)
NEUTROPHILS # BLD AUTO: 7.94 X10(3) UL (ref 1.5–7.7)
NEUTROPHILS NFR BLD AUTO: 66 %
NONHDLC SERPL-MCNC: 208 MG/DL (ref ?–130)
OSMOLALITY SERPL CALC.SUM OF ELEC: 286 MOSM/KG (ref 275–295)
PLATELET # BLD AUTO: 362 10(3)UL (ref 150–450)
POTASSIUM SERPL-SCNC: 4 MMOL/L (ref 3.5–5.1)
PROT SERPL-MCNC: 7.1 G/DL (ref 6.4–8.2)
RBC # BLD AUTO: 4.84 X10(6)UL
SODIUM SERPL-SCNC: 139 MMOL/L (ref 136–145)
TRIGL SERPL-MCNC: 178 MG/DL (ref 30–149)
VIT B12 SERPL-MCNC: 418 PG/ML (ref 193–986)
VLDLC SERPL CALC-MCNC: 36 MG/DL (ref 0–30)
WBC # BLD AUTO: 12 X10(3) UL (ref 4–11)

## 2022-05-31 PROCEDURE — 85025 COMPLETE CBC W/AUTO DIFF WBC: CPT | Performed by: FAMILY MEDICINE

## 2022-05-31 PROCEDURE — 80061 LIPID PANEL: CPT

## 2022-05-31 PROCEDURE — 84080 ASSAY ALKALINE PHOSPHATASES: CPT | Performed by: FAMILY MEDICINE

## 2022-05-31 PROCEDURE — 36415 COLL VENOUS BLD VENIPUNCTURE: CPT

## 2022-05-31 PROCEDURE — 82607 VITAMIN B-12: CPT | Performed by: FAMILY MEDICINE

## 2022-05-31 PROCEDURE — 80053 COMPREHEN METABOLIC PANEL: CPT | Performed by: FAMILY MEDICINE

## 2022-05-31 PROCEDURE — 84075 ASSAY ALKALINE PHOSPHATASE: CPT | Performed by: FAMILY MEDICINE

## 2022-05-31 NOTE — TELEPHONE ENCOUNTER
Patient went to [facility] for labs. Alk phos isoenzyme added. Future lab orders placed. Referral placed.

## 2022-05-31 NOTE — TELEPHONE ENCOUNTER
----- Message from Anat Lamb PA-C sent at 5/31/2022  1:17 PM CDT -----  Normal  red blood cell counts. White blood cell count is elevated do you have any signs or symptoms of an infection? Normal kidney testing. Alkaline phosphatase elevated this usually is from a fatty liver sending you to a dietitian to help with diet and lifestyle. Repeat liver function tests in 2 months. Add alkaline phosphatase isoenzymes  Normal blood sugar testing. Normal B 12 level     Sent to [facility] please place orders to do  liver function tests in 2 months.     [facility] add alkaline phosphatase isoenzymes

## 2022-05-31 NOTE — PROGRESS NOTES
Lipids are elevated refer patient to dietitian. Repeat lipids in 6 months. Decrease saturated fats. Eat lean cuts of meat such as chicken and fish (salmon and tuna). If able, add gabriella seeds, almonds, walnuts, pumpkin seeds, sunflower seeds, beans, whole grain cereals to diet. Cook with grape seed oil or olive oil. Review the Mediterranean diet on line.     Sent to [facility] please refer to dietitian and lipids in 6 months

## 2022-06-02 LAB
ALK-PHOSPHATASE BONE CALC: 35 U/L
ALK-PHOSPHATASE LIVER CALC: 93 U/L
ALK-PHOSPHATASE OTHER CALC: 0 U/L
ALKALINE PHOSPHATASE: 128 U/L

## 2022-06-05 ENCOUNTER — HOSPITAL ENCOUNTER (OUTPATIENT)
Age: 27
Discharge: HOME | End: 2022-06-05
Attending: EMERGENCY MEDICINE
Payer: MEDICAID

## 2022-06-05 VITALS
RESPIRATION RATE: 18 BRPM | DIASTOLIC BLOOD PRESSURE: 81 MMHG | HEART RATE: 78 BPM | TEMPERATURE: 97 F | SYSTOLIC BLOOD PRESSURE: 103 MMHG | OXYGEN SATURATION: 99 %

## 2022-06-05 DIAGNOSIS — F33.1 MODERATE RECURRENT MAJOR DEPRESSION (HCC): ICD-10-CM

## 2022-06-05 DIAGNOSIS — N94.6 DYSMENORRHEA: ICD-10-CM

## 2022-06-05 DIAGNOSIS — F41.0 PANIC ATTACKS: ICD-10-CM

## 2022-06-05 DIAGNOSIS — F40.10 SOCIAL ANXIETY DISORDER: ICD-10-CM

## 2022-06-05 DIAGNOSIS — Z91.51 HISTORY OF SUICIDE ATTEMPT: ICD-10-CM

## 2022-06-05 DIAGNOSIS — F41.1 GAD (GENERALIZED ANXIETY DISORDER): ICD-10-CM

## 2022-06-05 DIAGNOSIS — Z79.899 MEDICATION MANAGEMENT: ICD-10-CM

## 2022-06-05 DIAGNOSIS — Z30.41 ORAL CONTRACEPTIVE USE: ICD-10-CM

## 2022-06-05 DIAGNOSIS — R10.9 ABDOMINAL PAIN OF UNKNOWN ETIOLOGY: Primary | ICD-10-CM

## 2022-06-05 DIAGNOSIS — F17.200 SMOKER: ICD-10-CM

## 2022-06-05 DIAGNOSIS — F41.0 PANIC DISORDER WITHOUT AGORAPHOBIA: ICD-10-CM

## 2022-06-05 DIAGNOSIS — E89.0 HYPOTHYROIDISM ASSOCIATED WITH SURGICAL PROCEDURE: ICD-10-CM

## 2022-06-05 DIAGNOSIS — Z86.59 HISTORY OF SUICIDAL IDEATION: ICD-10-CM

## 2022-06-05 DIAGNOSIS — J30.1 SEASONAL ALLERGIC RHINITIS DUE TO POLLEN: ICD-10-CM

## 2022-06-05 DIAGNOSIS — L85.9 HYPERKERATOSIS OF SKIN: ICD-10-CM

## 2022-06-05 DIAGNOSIS — K52.9 GASTROENTERITIS: ICD-10-CM

## 2022-06-05 DIAGNOSIS — F50.89 PSYCHOGENIC VOMITING WITH NAUSEA: ICD-10-CM

## 2022-06-05 LAB
B-HCG UR QL: NEGATIVE
POCT BLOOD URINE: NEGATIVE
POCT GLUCOSE URINE: NEGATIVE MG/DL
POCT KETONE URINE: NEGATIVE MG/DL
POCT NITRITE URINE: NEGATIVE
POCT PH URINE: 6.5 (ref 5–8)
POCT PROTEIN URINE: 30 MG/DL
POCT SPECIFIC GRAVITY URINE: 1.02
POCT UROBILINOGEN URINE: 1 MG/DL

## 2022-06-05 PROCEDURE — 99213 OFFICE O/P EST LOW 20 MIN: CPT | Performed by: EMERGENCY MEDICINE

## 2022-06-05 PROCEDURE — 81002 URINALYSIS NONAUTO W/O SCOPE: CPT | Performed by: EMERGENCY MEDICINE

## 2022-06-05 PROCEDURE — 81025 URINE PREGNANCY TEST: CPT | Performed by: EMERGENCY MEDICINE

## 2022-06-05 RX ORDER — ONDANSETRON 8 MG/1
8 TABLET, ORALLY DISINTEGRATING ORAL EVERY 4 HOURS PRN
Qty: 10 TABLET | Refills: 0 | Status: SHIPPED | OUTPATIENT
Start: 2022-06-05 | End: 2022-06-12

## 2022-06-05 NOTE — ED INITIAL ASSESSMENT (HPI)
Pt has had nausea vomiting and diarrhea since 10 am yesterday  Is only yusra to keep down water.   No specific pain, but generalized discomfort

## 2022-07-25 ENCOUNTER — HOSPITAL ENCOUNTER (OUTPATIENT)
Age: 27
Discharge: HOME | End: 2022-07-25
Payer: MEDICAID

## 2022-07-25 VITALS
HEIGHT: 69 IN | HEART RATE: 77 BPM | BODY MASS INDEX: 35.7 KG/M2 | RESPIRATION RATE: 18 BRPM | WEIGHT: 241 LBS | OXYGEN SATURATION: 98 % | DIASTOLIC BLOOD PRESSURE: 81 MMHG | SYSTOLIC BLOOD PRESSURE: 98 MMHG | TEMPERATURE: 98 F

## 2022-07-25 DIAGNOSIS — K64.9 HEMORRHOIDS, UNSPECIFIED HEMORRHOID TYPE: ICD-10-CM

## 2022-07-25 DIAGNOSIS — K62.89 ANAL OR RECTAL PAIN: ICD-10-CM

## 2022-07-25 DIAGNOSIS — J06.9 VIRAL URI WITH COUGH: Primary | ICD-10-CM

## 2022-07-25 LAB — SARS-COV-2 RNA RESP QL NAA+PROBE: NOT DETECTED

## 2022-07-25 PROCEDURE — 99213 OFFICE O/P EST LOW 20 MIN: CPT | Performed by: NURSE PRACTITIONER

## 2022-07-25 PROCEDURE — U0002 COVID-19 LAB TEST NON-CDC: HCPCS | Performed by: NURSE PRACTITIONER

## 2022-07-25 RX ORDER — BENZONATATE 200 MG/1
200 CAPSULE ORAL 3 TIMES DAILY PRN
Qty: 20 CAPSULE | Refills: 0 | Status: SHIPPED | OUTPATIENT
Start: 2022-07-25 | End: 2022-08-01

## 2022-07-25 RX ORDER — HYDROCORTISONE ACETATE 25 MG/1
25 SUPPOSITORY RECTAL 2 TIMES DAILY PRN
Qty: 12 SUPPOSITORY | Refills: 0 | Status: SHIPPED | OUTPATIENT
Start: 2022-07-25 | End: 2022-08-24

## 2022-07-25 NOTE — ED INITIAL ASSESSMENT (HPI)
Head & nasal congestion causing post nasal drip & sore throat. Negative home Covid test PTA. Denies fever. Benadryl sinus PTA. Onset 72 hours. Also concerned about hemorrhoids no bleeding just swelling.

## 2022-10-21 ENCOUNTER — HOSPITAL ENCOUNTER (OUTPATIENT)
Age: 27
Discharge: HOME | End: 2022-10-21
Payer: MEDICAID

## 2022-10-21 VITALS
HEART RATE: 86 BPM | SYSTOLIC BLOOD PRESSURE: 140 MMHG | RESPIRATION RATE: 18 BRPM | DIASTOLIC BLOOD PRESSURE: 91 MMHG | TEMPERATURE: 98 F | OXYGEN SATURATION: 98 %

## 2022-10-21 DIAGNOSIS — J06.9 UPPER RESPIRATORY TRACT INFECTION, UNSPECIFIED TYPE: ICD-10-CM

## 2022-10-21 DIAGNOSIS — J01.00 ACUTE MAXILLARY SINUSITIS, RECURRENCE NOT SPECIFIED: Primary | ICD-10-CM

## 2022-10-21 PROCEDURE — 99213 OFFICE O/P EST LOW 20 MIN: CPT | Performed by: NURSE PRACTITIONER

## 2022-10-21 RX ORDER — FLUTICASONE PROPIONATE 50 MCG
2 SPRAY, SUSPENSION (ML) NASAL DAILY
Qty: 16 G | Refills: 0 | Status: SHIPPED | OUTPATIENT
Start: 2022-10-21 | End: 2022-11-20

## 2022-10-21 RX ORDER — ALBUTEROL SULFATE 90 UG/1
2 AEROSOL, METERED RESPIRATORY (INHALATION) EVERY 4 HOURS PRN
Qty: 1 EACH | Refills: 0 | Status: SHIPPED | OUTPATIENT
Start: 2022-10-21 | End: 2022-11-20

## 2022-10-21 RX ORDER — AMOXICILLIN AND CLAVULANATE POTASSIUM 875; 125 MG/1; MG/1
1 TABLET, FILM COATED ORAL 2 TIMES DAILY
Qty: 20 TABLET | Refills: 0 | Status: SHIPPED | OUTPATIENT
Start: 2022-10-21 | End: 2022-10-31

## 2022-10-21 NOTE — ED INITIAL ASSESSMENT (HPI)
Pt has had covid for 1 week, and still has severe sinus congestion dn cough and the congestion is bothering her teeth

## 2022-10-22 RX ORDER — ONDANSETRON 4 MG/1
4 TABLET, ORALLY DISINTEGRATING ORAL EVERY 4 HOURS PRN
Qty: 30 TABLET | Refills: 0 | Status: SHIPPED | OUTPATIENT
Start: 2022-10-22 | End: 2022-10-29

## 2023-03-06 ENCOUNTER — TELEPHONE (OUTPATIENT)
Dept: FAMILY MEDICINE CLINIC | Facility: CLINIC | Age: 28
End: 2023-03-06

## 2023-03-07 NOTE — TELEPHONE ENCOUNTER
Pt scheduled appt with Demarco Oviedo for 3/20/23 through [address] Pt scheduled for:    Been experiencing a dull pain in my chest.    Please advise. Thank you.

## 2023-03-07 NOTE — TELEPHONE ENCOUNTER
Spoke to patient, stated she notice dull tightness in her right breast about a week ago. Described as intermittent chest tightness that starts when she wakes up,  rated 3-4/10,  feels better when she goes to bed. Patient scheduled appt for 3/20. Okay to wait until then? Please advise. Statement Selected

## 2023-03-08 NOTE — TELEPHONE ENCOUNTER
If symptoms are not improving and we do not have any availability at the office she should be seen at the immediate care center.   She can try ibuprofen 400 to 600 mg 3 times a day if tolerated if she feels symptoms are stable and not worsening she can keep appointment to or be on a waiting list

## 2023-03-13 RX ORDER — LEVONORGESTREL AND ETHINYL ESTRADIOL 0.1-0.02MG
KIT ORAL
Qty: 84 TABLET | Refills: 3 | OUTPATIENT
Start: 2023-03-13

## 2023-03-13 NOTE — TELEPHONE ENCOUNTER
Requested Prescriptions     Refused Prescriptions Disp Refills   Arden Saucer 0.1-20 MG-MCG Oral Tab [Pharmacy Med Name: Melissa Chambers 0.1MG/0.02MG TABS 28S] 84 tablet 3     Sig: TAKE 1 TABLET BY MOUTH DAILY     Refused By: Shabana Urias     Reason for Refusal: Patient has requested refill too soon     Last refill 5/22/22 for one year.

## 2023-03-14 RX ORDER — LEVONORGESTREL AND ETHINYL ESTRADIOL 0.1-0.02MG
1 KIT ORAL DAILY
Qty: 84 TABLET | Refills: 3 | Status: SHIPPED | OUTPATIENT
Start: 2023-03-14

## 2023-03-14 RX ORDER — LEVONORGESTREL AND ETHINYL ESTRADIOL 0.1-0.02MG
1 KIT ORAL DAILY
Qty: 84 TABLET | Refills: 3 | Status: CANCELLED | OUTPATIENT
Start: 2023-03-14

## 2023-03-14 NOTE — TELEPHONE ENCOUNTER
Patient calling on status of refill request from 3/13 her birth control mediation     Is completely out of medication would like to discuss with medical staff

## 2023-05-30 ENCOUNTER — HOSPITAL ENCOUNTER (OUTPATIENT)
Age: 28
Discharge: HOME | End: 2023-05-30
Payer: MEDICAID

## 2023-05-30 VITALS
SYSTOLIC BLOOD PRESSURE: 126 MMHG | RESPIRATION RATE: 20 BRPM | OXYGEN SATURATION: 98 % | HEIGHT: 69 IN | TEMPERATURE: 97 F | HEART RATE: 81 BPM | BODY MASS INDEX: 37.77 KG/M2 | WEIGHT: 255 LBS | DIASTOLIC BLOOD PRESSURE: 97 MMHG

## 2023-05-30 DIAGNOSIS — K04.7 DENTAL INFECTION: Primary | ICD-10-CM

## 2023-05-30 PROCEDURE — 99213 OFFICE O/P EST LOW 20 MIN: CPT | Performed by: PHYSICIAN ASSISTANT

## 2023-05-30 RX ORDER — PENICILLIN V POTASSIUM 500 MG/1
500 TABLET ORAL 4 TIMES DAILY
Qty: 40 TABLET | Refills: 0 | Status: SHIPPED | OUTPATIENT
Start: 2023-05-30 | End: 2023-06-09

## 2023-05-30 NOTE — ED INITIAL ASSESSMENT (HPI)
Left sided sinus/face pain and left sided dental pain x 2-3 days. No fevers. Taking otc dayquil and benadryl. Ibuprofen and tylenol no relief.

## 2023-05-30 NOTE — DISCHARGE INSTRUCTIONS
800 mg of ibuprofen every 8 hours. Salt water gargles. Antibiotic as written.   Follow-up with dentistry in 7 to 10 days to recheck crown

## 2023-06-05 DIAGNOSIS — E89.0 POSTOPERATIVE HYPOTHYROIDISM: Primary | ICD-10-CM

## 2023-06-05 RX ORDER — LEVOTHYROXINE SODIUM 137 UG/1
137 TABLET ORAL DAILY
Qty: 30 TABLET | Refills: 0 | Status: SHIPPED | OUTPATIENT
Start: 2023-06-05

## 2023-06-05 NOTE — TELEPHONE ENCOUNTER
Patient states her ENT doctor advised her to call her doctor for a continuance on her medication; levothyroxine 137 MCG Oral Tab. Per patient she is requesting a call back from a nurse.

## 2023-06-26 DIAGNOSIS — E89.0 POSTOPERATIVE HYPOTHYROIDISM: ICD-10-CM

## 2023-06-26 RX ORDER — LEVOTHYROXINE SODIUM 137 UG/1
137 TABLET ORAL DAILY
Qty: 30 TABLET | Refills: 0 | OUTPATIENT
Start: 2023-06-26

## 2023-06-28 LAB
T4, FREE: 1.1 NG/DL (ref 0.8–1.8)
TSH: 1.1 MIU/L

## 2023-07-05 ENCOUNTER — OFFICE VISIT (OUTPATIENT)
Dept: FAMILY MEDICINE CLINIC | Facility: CLINIC | Age: 28
End: 2023-07-05
Payer: MEDICAID

## 2023-07-05 VITALS
RESPIRATION RATE: 18 BRPM | HEART RATE: 76 BPM | DIASTOLIC BLOOD PRESSURE: 76 MMHG | HEIGHT: 69 IN | WEIGHT: 256 LBS | TEMPERATURE: 99 F | BODY MASS INDEX: 37.92 KG/M2 | SYSTOLIC BLOOD PRESSURE: 136 MMHG

## 2023-07-05 DIAGNOSIS — E89.0 POSTOPERATIVE HYPOTHYROIDISM: ICD-10-CM

## 2023-07-05 DIAGNOSIS — E66.9 OBESITY (BMI 35.0-39.9 WITHOUT COMORBIDITY): ICD-10-CM

## 2023-07-05 DIAGNOSIS — K13.0 ANGULAR CHEILITIS: ICD-10-CM

## 2023-07-05 DIAGNOSIS — Z13.0 SCREENING FOR ENDOCRINE, NUTRITIONAL, METABOLIC AND IMMUNITY DISORDER: ICD-10-CM

## 2023-07-05 DIAGNOSIS — Z13.6 ENCOUNTER FOR LIPID SCREENING FOR CARDIOVASCULAR DISEASE: ICD-10-CM

## 2023-07-05 DIAGNOSIS — Z13.29 SCREENING FOR ENDOCRINE, NUTRITIONAL, METABOLIC AND IMMUNITY DISORDER: ICD-10-CM

## 2023-07-05 DIAGNOSIS — Z13.220 ENCOUNTER FOR LIPID SCREENING FOR CARDIOVASCULAR DISEASE: ICD-10-CM

## 2023-07-05 DIAGNOSIS — Z13.228 SCREENING FOR ENDOCRINE, NUTRITIONAL, METABOLIC AND IMMUNITY DISORDER: ICD-10-CM

## 2023-07-05 DIAGNOSIS — Z13.21 SCREENING FOR ENDOCRINE, NUTRITIONAL, METABOLIC AND IMMUNITY DISORDER: ICD-10-CM

## 2023-07-05 DIAGNOSIS — Z00.00 WELL FEMALE EXAM WITHOUT GYNECOLOGICAL EXAM: Primary | ICD-10-CM

## 2023-07-05 PROBLEM — D23.9: Status: ACTIVE | Noted: 2022-06-14

## 2023-07-05 PROCEDURE — 99395 PREV VISIT EST AGE 18-39: CPT | Performed by: FAMILY MEDICINE

## 2023-07-05 PROCEDURE — 3075F SYST BP GE 130 - 139MM HG: CPT | Performed by: FAMILY MEDICINE

## 2023-07-05 PROCEDURE — 99212 OFFICE O/P EST SF 10 MIN: CPT | Performed by: FAMILY MEDICINE

## 2023-07-05 PROCEDURE — 3008F BODY MASS INDEX DOCD: CPT | Performed by: FAMILY MEDICINE

## 2023-07-05 PROCEDURE — 3078F DIAST BP <80 MM HG: CPT | Performed by: FAMILY MEDICINE

## 2023-07-05 RX ORDER — LEVOTHYROXINE SODIUM 137 UG/1
137 TABLET ORAL DAILY
Qty: 90 TABLET | Refills: 3 | Status: SHIPPED | OUTPATIENT
Start: 2023-07-05

## 2023-07-06 PROBLEM — E89.0 POSTOPERATIVE HYPOTHYROIDISM: Status: ACTIVE | Noted: 2018-03-15

## 2023-07-06 PROBLEM — E66.9 OBESITY: Status: RESOLVED | Noted: 2020-01-21 | Resolved: 2023-07-06

## 2023-07-06 PROBLEM — E66.9 OBESITY (BMI 35.0-39.9 WITHOUT COMORBIDITY): Status: ACTIVE | Noted: 2020-01-21

## 2023-07-06 PROBLEM — K13.0 ANGULAR CHEILITIS: Status: ACTIVE | Noted: 2023-07-06

## 2023-07-11 DIAGNOSIS — E83.52 SERUM CALCIUM ELEVATED: Primary | ICD-10-CM

## 2023-07-11 LAB
ABSOLUTE BASOPHILS: 75 CELLS/UL (ref 0–200)
ABSOLUTE EOSINOPHILS: 235 CELLS/UL (ref 15–500)
ABSOLUTE LYMPHOCYTES: 2547 CELLS/UL (ref 850–3900)
ABSOLUTE MONOCYTES: 910 CELLS/UL (ref 200–950)
ABSOLUTE NEUTROPHILS: 6934 CELLS/UL (ref 1500–7800)
ALBUMIN/GLOBULIN RATIO: 1.5 (CALC) (ref 1–2.5)
ALBUMIN: 4.2 G/DL (ref 3.6–5.1)
ALKALINE PHOSPHATASE: 104 U/L (ref 31–125)
ALT: 6 U/L (ref 6–29)
AST: 9 U/L (ref 10–30)
BASOPHILS: 0.7 %
BILIRUBIN, TOTAL: 0.4 MG/DL (ref 0.2–1.2)
BUN: 10 MG/DL (ref 7–25)
CALCIUM: 10.7 MG/DL (ref 8.6–10.2)
CARBON DIOXIDE: 22 MMOL/L (ref 20–32)
CHLORIDE: 106 MMOL/L (ref 98–110)
CHOL/HDLC RATIO: 8.7 (CALC)
CHOLESTEROL, TOTAL: 277 MG/DL
CREATININE: 0.77 MG/DL (ref 0.5–0.96)
EGFR: 107 ML/MIN/1.73M2
EOSINOPHILS: 2.2 %
GLOBULIN: 2.8 G/DL (CALC) (ref 1.9–3.7)
GLUCOSE: 74 MG/DL (ref 65–99)
HDL CHOLESTEROL: 32 MG/DL
HEMATOCRIT: 44 % (ref 35–45)
HEMOGLOBIN A1C: 5.1 % OF TOTAL HGB
HEMOGLOBIN: 14.7 G/DL (ref 11.7–15.5)
LDL-CHOLESTEROL: 200 MG/DL (CALC)
LYMPHOCYTES: 23.8 %
MCH: 29.3 PG (ref 27–33)
MCHC: 33.4 G/DL (ref 32–36)
MCV: 87.6 FL (ref 80–100)
MONOCYTES: 8.5 %
MPV: 10 FL (ref 7.5–12.5)
NEUTROPHILS: 64.8 %
NON-HDL CHOLESTEROL: 245 MG/DL (CALC)
PLATELET COUNT: 406 THOUSAND/UL (ref 140–400)
POTASSIUM: 4.3 MMOL/L (ref 3.5–5.3)
PROTEIN, TOTAL: 7 G/DL (ref 6.1–8.1)
RDW: 12.8 % (ref 11–15)
RED BLOOD CELL COUNT: 5.02 MILLION/UL (ref 3.8–5.1)
SODIUM: 137 MMOL/L (ref 135–146)
TRIGLYCERIDES: 253 MG/DL
VITAMIN B12: 314 PG/ML (ref 200–1100)
WHITE BLOOD CELL COUNT: 10.7 THOUSAND/UL (ref 3.8–10.8)

## 2023-07-19 ENCOUNTER — TELEMEDICINE (OUTPATIENT)
Dept: FAMILY MEDICINE CLINIC | Facility: CLINIC | Age: 28
End: 2023-07-19
Payer: MEDICAID

## 2023-07-19 DIAGNOSIS — E78.2 MIXED HYPERLIPIDEMIA: ICD-10-CM

## 2023-07-19 DIAGNOSIS — F41.1 GAD (GENERALIZED ANXIETY DISORDER): ICD-10-CM

## 2023-07-19 DIAGNOSIS — E66.9 OBESITY (BMI 35.0-39.9 WITHOUT COMORBIDITY): ICD-10-CM

## 2023-07-19 DIAGNOSIS — E83.52 SERUM CALCIUM ELEVATED: Primary | ICD-10-CM

## 2023-07-19 PROCEDURE — 99214 OFFICE O/P EST MOD 30 MIN: CPT | Performed by: FAMILY MEDICINE

## 2023-07-19 RX ORDER — ROSUVASTATIN CALCIUM 5 MG/1
5 TABLET, COATED ORAL NIGHTLY
Qty: 90 TABLET | Refills: 0 | Status: SHIPPED | OUTPATIENT
Start: 2023-07-19

## 2023-07-20 PROBLEM — E83.52 SERUM CALCIUM ELEVATED: Status: ACTIVE | Noted: 2023-07-20

## 2023-07-20 PROBLEM — E78.2 MIXED HYPERLIPIDEMIA: Status: ACTIVE | Noted: 2023-07-20

## 2023-07-20 NOTE — PROGRESS NOTES
Genaro Tristan is a 32year old female. HPI:   Please note that the following visit was completed using two-way, real-time interactive audio and video communication. This has been done in good tayo to provide continuity of care in the best interest of the provider-patient relationship, due to the ongoing public health crisis/national emergency and because of restrictions of visitation. There are limitations of this visit as no physical exam could be performed. Every conscious effort was taken to allow for sufficient and adequate time. This billing was spent on reviewing labs, medications, radiology tests and decision making. Appropriate medical decision-making and tests are ordered as detailed in the plan of care above. Audiovideo call with patient to discuss abnormal blood work had very elevated lipid panel and elevated calcium    -Hypercalcemia  History of total thyroidectomy for goiter  First episode of elevated calcium is at 10.7 on 7/10/2023  No cramping, kidney stones  Has chronic mood disorder seeing psychiatrist  PTH not completed yet  Patient is not taking Tums or any supplements no calcium, vitamin D or magnesium supplements  Poor diet mostly processed foods  No palpitations or cramping in her muscles      --Hyperlipidemia  7/10/2023 cholesterol 277, HDL 32, triglycerides 253, , cholesterol HDL ratio 8.7  Patient does not plan on having children at any time in the future is wanting to go on cholesterol medication  For lifestyle choices gets anxious when she goes to the store so just picks up quick food  Admits her food is mostly processed  No family history of coronary artery disease known  No exercise  Denies any chest pain, SOB, dizziness or fainting. No swelling in the hands or feet. No symptoms of PAD.   Sees psychiatrist for mood disorder with severe anxiety  Weight 256 pounds BMI of 37 high risk for coronary artery disease  Due to mood disorder unmotivated for exercise though tells herself every day that she needs to. -- Low B12  Mood disorder  B12 314  No B12 taken does not know what to take takes no supplements and has poor eating behaviors  Does have a normal hemoglobin of 14.7.     Wt Readings from Last 6 Encounters:  07/05/23 : 256 lb (116.1 kg)  05/30/23 : 255 lb (115.7 kg)  07/25/22 : 241 lb (109.3 kg)  05/23/22 : 249 lb (112.9 kg)  05/21/21 : 260 lb (117.9 kg)  07/27/20 : 254 lb (115.2 kg)    Results for orders placed or performed in visit on 07/05/23   LIPID PANEL   Result Value Ref Range    CHOLESTEROL, TOTAL 277 (H) <200 mg/dL    HDL CHOLESTEROL 32 (L) > OR = 50 mg/dL    TRIGLYCERIDES 253 (H) <150 mg/dL    LDL-CHOLESTEROL 200 (H) mg/dL (calc)    CHOL/HDLC RATIO 8.7 (H) <5.0 (calc)    NON-HDL CHOLESTEROL 245 (H) <130 mg/dL (calc)   HEMOGLOBIN A1C   Result Value Ref Range    HEMOGLOBIN A1c 5.1 <5.7 % of total Hgb   COMP METABOLIC PANEL (14)   Result Value Ref Range    GLUCOSE 74 65 - 99 mg/dL    UREA NITROGEN (BUN) 10 7 - 25 mg/dL    CREATININE 0.77 0.50 - 0.96 mg/dL    EGFR 107 > OR = 60 mL/min/1.73m2    BUN/CREATININE RATIO NOT APPLICABLE 6 - 22 (calc)    SODIUM 137 135 - 146 mmol/L    POTASSIUM 4.3 3.5 - 5.3 mmol/L    CHLORIDE 106 98 - 110 mmol/L    CARBON DIOXIDE 22 20 - 32 mmol/L    CALCIUM 10.7 (H) 8.6 - 10.2 mg/dL    PROTEIN, TOTAL 7.0 6.1 - 8.1 g/dL    ALBUMIN 4.2 3.6 - 5.1 g/dL    GLOBULIN 2.8 1.9 - 3.7 g/dL (calc)    ALBUMIN/GLOBULIN RATIO 1.5 1.0 - 2.5 (calc)    BILIRUBIN, TOTAL 0.4 0.2 - 1.2 mg/dL    ALKALINE PHOSPHATASE 104 31 - 125 U/L    AST 9 (L) 10 - 30 U/L    ALT 6 6 - 29 U/L   CBC WITH DIFFERENTIAL WITH PLATELET   Result Value Ref Range    WHITE BLOOD CELL COUNT 10.7 3.8 - 10.8 Thousand/uL    RED BLOOD CELL COUNT 5.02 3.80 - 5.10 Million/uL    HEMOGLOBIN 14.7 11.7 - 15.5 g/dL    HEMATOCRIT 44.0 35.0 - 45.0 %    MCV 87.6 80.0 - 100.0 fL    MCH 29.3 27.0 - 33.0 pg    MCHC 33.4 32.0 - 36.0 g/dL    RDW 12.8 11.0 - 15.0 %    PLATELET COUNT 639 (H) 140 - 400 Thousand/uL    MPV 10.0 7.5 - 12.5 fL    ABSOLUTE NEUTROPHILS 6,934 1,500 - 7,800 cells/uL    ABSOLUTE LYMPHOCYTES 2,547 850 - 3,900 cells/uL    ABSOLUTE MONOCYTES 910 200 - 950 cells/uL    ABSOLUTE EOSINOPHILS 235 15 - 500 cells/uL    ABSOLUTE BASOPHILS 75 0 - 200 cells/uL    NEUTROPHILS 64.8 %    LYMPHOCYTES 23.8 %    MONOCYTES 8.5 %    EOSINOPHILS 2.2 %    BASOPHILS 0.7 %   VITAMIN B12   Result Value Ref Range    VITAMIN B12 314 200 - 1,100 pg/mL         Current Outpatient Medications   Medication Sig Dispense Refill    rosuvastatin 5 MG Oral Tab Take 1 tablet (5 mg total) by mouth nightly. 90 tablet 0    levothyroxine 137 MCG Oral Tab Take 137 mcg by mouth daily. 90 tablet 3    Levonorgestrel-Ethinyl Estrad (AVIANE) 0.1-20 MG-MCG Oral Tab Take 1 tablet by mouth daily. 84 tablet 3    clonazePAM 0.5 MG Oral Tab Take a 1/2 tablet daily as needed (Patient taking differently: Take 1 tablet (0.5 mg total) by mouth daily. Take a 1/2 tablet daily as needed) 15 tablet 0    FLUoxetine HCl 40 MG Oral Cap Take 2 capsules (80 mg total) by mouth daily. 60 capsule 2    busPIRone HCl 30 MG Oral Tab Take 1 tablet (30 mg total) by mouth 2 (two) times daily.  60 tablet 1      Past Medical History:   Diagnosis Date    Allergic rhinitis     Anxiety     Candidiasis of vulva and vagina     Depression     Disorder of thyroid     Dry skin dermatitis     Hypothyroidism     Obesity     Personal history of neurosis     Prolonged depressive reaction     Smoker 02/25/2015    Visual impairment     glasses      Social History:  Social History     Socioeconomic History    Marital status: Single   Tobacco Use    Smoking status: Former     Packs/day: 0.25     Years: 7.00     Pack years: 1.75     Types: Cigarettes     Quit date: 2/1/2020     Years since quitting: 3.4    Smokeless tobacco: Never   Vaping Use    Vaping Use: Every day    Start date: 2/1/2020    Substances: Nicotine    Devices: Disposable   Substance and Sexual Activity    Alcohol use: Yes     Alcohol/week: 0.0 standard drinks of alcohol     Comment: rarely    Drug use: Yes     Frequency: 7.0 times per week     Types: Cannabis   Other Topics Concern     Service No    Blood Transfusions No    Caffeine Concern Yes     Comment: 1 coffee, 1 tea, & 1-2 soda daily    Occupational Exposure No    Hobby Hazards No    Sleep Concern No    Stress Concern Yes    Weight Concern Yes    Special Diet No    Back Care No    Exercise Yes     Comment: 3 times weekly    Bike Helmet No    Seat Belt No    Self-Exams No        REVIEW OF SYSTEMS:   GENERAL HEALTH: feels well otherwise  SKIN: denies any unusual skin lesions or rashes  RESPIRATORY: denies shortness of breath with exertion  CARDIOVASCULAR: denies chest pain on exertion  GI: denies abdominal pain and denies heartburn  NEURO: denies headaches  Musculoskeletal: No motor deficits  Endocrine elevated lipid panel and calcium see above  EXAM:   No vitals taken due to tele-visit. 30 minutes time was spent on the video visit discussing health issues and risk factors with patient also reviewed labs with her and patient's record including prior labs, developing management plan and ordering tests and prescriptions. Full exam was not done secondary to COVID-19 pandemic. Reviewed medications, problem list and allergies. GENERAL: Patient is speaking in full sentences no increased work in breathing patient is alert and orientated x3.   Has generalized anxiety disorder which is severe flat affect does verbalize understanding and has good communication skills    ASSESSMENT AND PLAN:   Serum calcium elevated  (primary encounter diagnosis)  Mixed hyperlipidemia  Obesity (bmi 35.0-39.9 without comorbidity)  Klaus (generalized anxiety disorder)    Orders Placed This Encounter      Vitamin D [E]      Lipid Panel      Hepatic Function Panel (7) [E]      Meds & Refills for this Visit:  Requested Prescriptions     Signed Prescriptions Disp Refills    rosuvastatin 5 MG Oral Tab 90 tablet 0     Sig: Take 1 tablet (5 mg total) by mouth nightly. Imaging & Consults:  None  1. Serum calcium elevated  PTH level ordered  If elevated may need a parathyroid scanning or referral to endocrinology  For now avoid calcium supplement but take vitamin D supplement  - VITAMIN D; Future    2. Mixed hyperlipidemia  Reviewed medication benefits and side effects. Patient wants to start the medication until she is able to lose weight no plans on pregnancy with the amount of anxiety she has will go off medicine if she plans on pregnancy  At length discussed lifestyle changes including avoiding processed foods saturated fats  Decrease saturated fats. Eat lean cuts of meat such as chicken and fish (salmon and tuna). If able, add gabriella seeds, almonds, walnuts, pumpkin seeds, sunflower seeds, beans, whole grain cereals to diet. Cook with grape seed oil or olive oil. Review the Mediterranean diet on line. 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 Dispense: 90 tablet; Refill: 0  - LIPID PANEL; Future  - HEPATIC FUNCTION PANEL (7); Future  - LIPID PANEL  - HEPATIC FUNCTION PANEL (7)    3. Obesity (BMI 35.0-39.9 without comorbidity)  Weight loss discussed patient should start exercising daily for 30-40 minutes also reducing all carbohydrates both simple and complex. Can eat fruits and vegetables and small frequent meals of healthy combinations of protein and carbohydrate. Avoiding packaged/processed. 4. ELI (generalized anxiety disorder)  Continue with psychiatrist discussed the relationship of diet, exercise and supplements  Can psychiatrist about MTHFR which is possible with her history of anxiety    Supplement suggestions for energy/anxiety/insomnia.   Methyl folate 400 mcg and methylcobalamin 1000 mcg. Vitamin D3 @ 5000 international units, magnesium glycinate 400 mg     The patient indicates understanding of these issues and agrees to the plan. The patient is asked to return 6 months labs to be done in 3 months or as needed.

## 2023-07-20 NOTE — PATIENT INSTRUCTIONS
Supplement suggestions for energy/anxiety/insomnia. Methyl folate 122 mcg (folic acid) and methylcobalamin 1000 mcg (B12)  Vitamin D3 @ 5000 international units, magnesium glycinate 400 mg       Decrease saturated fats. Eat lean cuts of meat such as chicken and fish (salmon and tuna). If able, add gabriella seeds, almonds, walnuts, pumpkin seeds, sunflower seeds, beans, whole grain cereals to diet. Cook with grape seed oil or olive oil. Review the Mediterranean diet on line.

## 2023-07-26 LAB
ALBUMIN/GLOBULIN RATIO: 1.5 (CALC) (ref 1–2.5)
ALBUMIN: 4 G/DL (ref 3.6–5.1)
ALKALINE PHOSPHATASE: 101 U/L (ref 31–125)
ALT: 10 U/L (ref 6–29)
AST: 11 U/L (ref 10–30)
BILIRUBIN, DIRECT: 0.1 MG/DL
BILIRUBIN, INDIRECT: 0.3 MG/DL (CALC) (ref 0.2–1.2)
BILIRUBIN, TOTAL: 0.4 MG/DL (ref 0.2–1.2)
CALCIUM: 9.6 MG/DL (ref 8.6–10.2)
CHOL/HDLC RATIO: 6.2 (CALC)
CHOLESTEROL, TOTAL: 212 MG/DL
GLOBULIN: 2.7 G/DL (CALC) (ref 1.9–3.7)
HDL CHOLESTEROL: 34 MG/DL
LDL-CHOLESTEROL: 145 MG/DL (CALC)
NON-HDL CHOLESTEROL: 178 MG/DL (CALC)
PARATHYROID HORMONE,$INTACT: 43 PG/ML (ref 16–77)
PROTEIN, TOTAL: 6.7 G/DL (ref 6.1–8.1)
TRIGLYCERIDES: 191 MG/DL

## 2023-07-27 DIAGNOSIS — E78.2 MIXED HYPERLIPIDEMIA: Primary | ICD-10-CM

## 2023-10-06 ENCOUNTER — TELEPHONE (OUTPATIENT)
Dept: FAMILY MEDICINE CLINIC | Facility: CLINIC | Age: 28
End: 2023-10-06

## 2023-10-06 ENCOUNTER — MED REC SCAN ONLY (OUTPATIENT)
Dept: FAMILY MEDICINE CLINIC | Facility: CLINIC | Age: 28
End: 2023-10-06

## 2023-10-06 NOTE — TELEPHONE ENCOUNTER
Received fax on 10/04/2023 requesting medical records. Requesting medical records from 01/01/2020 to present. Name: [facility] [employer]   Address[facility] [place] [address] [facility] [address]  Briana [address]: [phone]  LJL:[id number]    Original sent to [facility], copy sent to medical records.  Red Tag # [id number]

## 2023-10-09 NOTE — TELEPHONE ENCOUNTER
Chief Complaint   Patient presents with    Hair/Scalp Problem     Here with dad for dry area on scalp that parents think may be ringworm. 1. Have you been to the ER, urgent care clinic since your last visit? Hospitalized since your last visit? No    2. Have you seen or consulted any other health care providers outside of the [address] [place] [address] Avenue since your last visit? Include any pap smears or colon screening.  No buspar approved qty 30 days + 1 refill

## 2023-10-14 DIAGNOSIS — E78.2 MIXED HYPERLIPIDEMIA: ICD-10-CM

## 2023-10-16 RX ORDER — ROSUVASTATIN CALCIUM 5 MG/1
5 TABLET, COATED ORAL NIGHTLY
Qty: 90 TABLET | Refills: 0 | Status: SHIPPED | OUTPATIENT
Start: 2023-10-16

## 2023-10-16 NOTE — TELEPHONE ENCOUNTER
Medication(s) to Refill:   Requested Prescriptions     Pending Prescriptions Disp Refills    ROSUVASTATIN 5 MG Oral Tab [Pharmacy Med Name: ROSUVASTATIN 5MG TABLETS] 90 tablet 0     Sig: TAKE 1 TABLET(5 MG) BY MOUTH EVERY NIGHT     Last Time Medication was Filled:  7/19/23    Recent Visits  Date Type Provider Dept   07/05/23 Office Visit Frida Craig PA-C [facility]     Future Appointments  No visits were found

## 2023-11-03 ENCOUNTER — HOSPITAL ENCOUNTER (OUTPATIENT)
Age: 28
Discharge: HOME | End: 2023-11-03
Payer: MEDICAID

## 2023-11-03 VITALS
RESPIRATION RATE: 22 BRPM | DIASTOLIC BLOOD PRESSURE: 100 MMHG | HEART RATE: 111 BPM | SYSTOLIC BLOOD PRESSURE: 136 MMHG | OXYGEN SATURATION: 97 % | TEMPERATURE: 99 F

## 2023-11-03 DIAGNOSIS — K64.8 HEMORRHOID PROLAPSE: Primary | ICD-10-CM

## 2023-11-03 NOTE — DISCHARGE INSTRUCTIONS
See attached information    Start taking stool softeners (e.g. colace)  Use topical ointment as needed for pain  As discussed, if hemorrhoid reappears you can apply gentle pressure to self-reduce the hemorrhoid  Avoid spicy, greasy, fatty, fried, \"fast\" foods  Avoid dairy  Slowly progress diet as tolerated (liquids > smoothies > bananas / toast / apple sauce > others)  Follow up with your doctor as needed or if symptoms persist

## 2023-11-03 NOTE — ED INITIAL ASSESSMENT (HPI)
Pt c/o nausea and loose stools. Pt states she has stool particles. Pt c/o anal pain. Pt denies rectal bleeding and no blood in stool. Pt states she had 3 teeth extracted on Monday. Pt not taking an antibiotic.

## 2023-12-04 RX ORDER — TIMOLOL MALEATE 5 MG/ML
1 SOLUTION/ DROPS OPHTHALMIC DAILY
Qty: 84 TABLET | Refills: 0 | Status: SHIPPED | OUTPATIENT
Start: 2023-12-04

## 2024-01-14 DIAGNOSIS — E78.2 MIXED HYPERLIPIDEMIA: ICD-10-CM

## 2024-01-15 RX ORDER — ROSUVASTATIN CALCIUM 5 MG/1
5 TABLET, COATED ORAL NIGHTLY
Qty: 90 TABLET | Refills: 0 | Status: SHIPPED | OUTPATIENT
Start: 2024-01-15

## 2024-01-15 NOTE — TELEPHONE ENCOUNTER
Requested Prescriptions     Signed Prescriptions Disp Refills    ROSUVASTATIN 5 MG Oral Tab 90 tablet 0     Sig: TAKE 1 TABLET(5 MG) BY MOUTH EVERY NIGHT     Authorizing Provider: ANURAG PANG     Ordering User: GEOVANNA SAMAYOA     Refilled per protocol/OV notes

## 2024-02-12 RX ORDER — TIMOLOL MALEATE 5 MG/ML
1 SOLUTION/ DROPS OPHTHALMIC DAILY
Qty: 84 TABLET | Refills: 0 | Status: SHIPPED | OUTPATIENT
Start: 2024-02-12

## 2024-04-17 DIAGNOSIS — E78.2 MIXED HYPERLIPIDEMIA: ICD-10-CM

## 2024-04-18 RX ORDER — TIMOLOL MALEATE 5 MG/ML
1 SOLUTION/ DROPS OPHTHALMIC DAILY
Qty: 84 TABLET | Refills: 0 | Status: SHIPPED | OUTPATIENT
Start: 2024-04-18

## 2024-04-18 RX ORDER — ROSUVASTATIN CALCIUM 5 MG/1
5 TABLET, COATED ORAL NIGHTLY
Qty: 90 TABLET | Refills: 0 | Status: SHIPPED | OUTPATIENT
Start: 2024-04-18

## 2024-04-18 NOTE — TELEPHONE ENCOUNTER
Requested Prescriptions     Signed Prescriptions Disp Refills    ROSUVASTATIN 5 MG Oral Tab 90 tablet 0     Sig: TAKE 1 TABLET(5 MG) BY MOUTH EVERY NIGHT     Authorizing Provider: ANURAG PANG     Ordering User: GEOVANNA SAMAYOA    VIENVA 0.1-20 MG-MCG Oral Tab 84 tablet 0     Sig: TAKE 1 TABLET BY MOUTH DAILY     Authorizing Provider: ANURAG PANG     Ordering User: GEOVANNA SAMAYOA     Refilled per protocol/OV notes

## 2024-04-20 LAB
ALBUMIN/GLOBULIN RATIO: 1.4 (CALC) (ref 1–2.5)
ALBUMIN: 3.9 G/DL (ref 3.6–5.1)
ALKALINE PHOSPHATASE: 107 U/L (ref 31–125)
ALT: 10 U/L (ref 6–29)
AST: 12 U/L (ref 10–30)
BILIRUBIN, DIRECT: 0.1 MG/DL
BILIRUBIN, INDIRECT: 0.3 MG/DL (CALC) (ref 0.2–1.2)
BILIRUBIN, TOTAL: 0.4 MG/DL (ref 0.2–1.2)
CHOL/HDLC RATIO: 5 (CALC)
CHOLESTEROL, TOTAL: 169 MG/DL
GLOBULIN: 2.7 G/DL (CALC) (ref 1.9–3.7)
HDL CHOLESTEROL: 34 MG/DL
LDL-CHOLESTEROL: 106 MG/DL (CALC)
NON-HDL CHOLESTEROL: 135 MG/DL (CALC)
PROTEIN, TOTAL: 6.6 G/DL (ref 6.1–8.1)
TRIGLYCERIDES: 172 MG/DL

## 2024-04-22 LAB
CALCIUM: 9.4 MG/DL (ref 8.6–10.2)
PARATHYROID HORMONE,$INTACT: 68 PG/ML (ref 16–77)

## 2024-04-28 ENCOUNTER — HOSPITAL ENCOUNTER (EMERGENCY)
Facility: HOSPITAL | Age: 29
Discharge: HOME | End: 2024-04-28
Attending: EMERGENCY MEDICINE
Payer: MEDICAID

## 2024-04-28 ENCOUNTER — APPOINTMENT (OUTPATIENT)
Dept: CT IMAGING | Facility: HOSPITAL | Age: 29
End: 2024-04-28
Attending: EMERGENCY MEDICINE
Payer: MEDICAID

## 2024-04-28 ENCOUNTER — APPOINTMENT (OUTPATIENT)
Dept: ULTRASOUND IMAGING | Facility: HOSPITAL | Age: 29
End: 2024-04-28
Attending: EMERGENCY MEDICINE
Payer: MEDICAID

## 2024-04-28 ENCOUNTER — HOSPITAL ENCOUNTER (OUTPATIENT)
Age: 29
Discharge: ANOTHER HOSPITAL | End: 2024-04-28
Payer: MEDICAID

## 2024-04-28 VITALS
TEMPERATURE: 97 F | DIASTOLIC BLOOD PRESSURE: 112 MMHG | SYSTOLIC BLOOD PRESSURE: 141 MMHG | RESPIRATION RATE: 24 BRPM | HEART RATE: 119 BPM | OXYGEN SATURATION: 98 %

## 2024-04-28 VITALS
HEART RATE: 97 BPM | HEIGHT: 69 IN | RESPIRATION RATE: 20 BRPM | OXYGEN SATURATION: 98 % | TEMPERATURE: 98 F | WEIGHT: 250 LBS | SYSTOLIC BLOOD PRESSURE: 124 MMHG | BODY MASS INDEX: 37.03 KG/M2 | DIASTOLIC BLOOD PRESSURE: 78 MMHG

## 2024-04-28 DIAGNOSIS — R11.2 NAUSEA AND VOMITING IN ADULT: ICD-10-CM

## 2024-04-28 DIAGNOSIS — R10.9 ABDOMINAL PAIN OF UNKNOWN ETIOLOGY: Primary | ICD-10-CM

## 2024-04-28 DIAGNOSIS — K76.9 LESION OF LIVER: ICD-10-CM

## 2024-04-28 DIAGNOSIS — R10.13 EPIGASTRIC PAIN: Primary | ICD-10-CM

## 2024-04-28 LAB
ALBUMIN SERPL-MCNC: 4.1 G/DL (ref 3.4–5)
ALBUMIN/GLOB SERPL: 0.9 {RATIO} (ref 1–2)
ALP LIVER SERPL-CCNC: 159 U/L
ALT SERPL-CCNC: 43 U/L
ANION GAP SERPL CALC-SCNC: 8 MMOL/L (ref 0–18)
AST SERPL-CCNC: 31 U/L (ref 15–37)
B-HCG UR QL: NEGATIVE
BASOPHILS # BLD AUTO: 0.05 X10(3) UL (ref 0–0.2)
BASOPHILS NFR BLD AUTO: 0.4 %
BILIRUB SERPL-MCNC: 1.4 MG/DL (ref 0.1–2)
BILIRUB UR QL STRIP.AUTO: NEGATIVE
BUN BLD-MCNC: 8 MG/DL (ref 9–23)
CALCIUM BLD-MCNC: 10.1 MG/DL (ref 8.5–10.1)
CHLORIDE SERPL-SCNC: 105 MMOL/L (ref 98–112)
CLARITY UR REFRACT.AUTO: CLEAR
CO2 SERPL-SCNC: 21 MMOL/L (ref 21–32)
CREAT BLD-MCNC: 1 MG/DL
EGFRCR SERPLBLD CKD-EPI 2021: 78 ML/MIN/1.73M2 (ref 60–?)
EOSINOPHIL # BLD AUTO: 0 X10(3) UL (ref 0–0.7)
EOSINOPHIL NFR BLD AUTO: 0 %
ERYTHROCYTE [DISTWIDTH] IN BLOOD BY AUTOMATED COUNT: 12.6 %
FLUAV + FLUBV RNA SPEC NAA+PROBE: NEGATIVE
FLUAV + FLUBV RNA SPEC NAA+PROBE: NEGATIVE
GLOBULIN PLAS-MCNC: 4.4 G/DL (ref 2.8–4.4)
GLUCOSE BLD-MCNC: 114 MG/DL (ref 70–99)
GLUCOSE UR STRIP.AUTO-MCNC: NORMAL MG/DL
HCT VFR BLD AUTO: 45.8 %
HGB BLD-MCNC: 16.1 G/DL
IMM GRANULOCYTES # BLD AUTO: 0.09 X10(3) UL (ref 0–1)
IMM GRANULOCYTES NFR BLD: 0.7 %
KETONES UR STRIP.AUTO-MCNC: 40 MG/DL
LEUKOCYTE ESTERASE UR QL STRIP.AUTO: NEGATIVE
LIPASE SERPL-CCNC: 23 U/L (ref 13–75)
LYMPHOCYTES # BLD AUTO: 0.88 X10(3) UL (ref 1–4)
LYMPHOCYTES NFR BLD AUTO: 6.7 %
MCH RBC QN AUTO: 29.4 PG (ref 26–34)
MCHC RBC AUTO-ENTMCNC: 35.2 G/DL (ref 31–37)
MCV RBC AUTO: 83.7 FL
MONOCYTES # BLD AUTO: 0.73 X10(3) UL (ref 0.1–1)
MONOCYTES NFR BLD AUTO: 5.5 %
NEUTROPHILS # BLD AUTO: 11.47 X10 (3) UL (ref 1.5–7.7)
NEUTROPHILS # BLD AUTO: 11.47 X10(3) UL (ref 1.5–7.7)
NEUTROPHILS NFR BLD AUTO: 86.7 %
NITRITE UR QL STRIP.AUTO: NEGATIVE
OSMOLALITY SERPL CALC.SUM OF ELEC: 277 MOSM/KG (ref 275–295)
PH UR STRIP.AUTO: 8 [PH] (ref 5–8)
PLATELET # BLD AUTO: 317 10(3)UL (ref 150–450)
POTASSIUM SERPL-SCNC: 3.4 MMOL/L (ref 3.5–5.1)
PROT SERPL-MCNC: 8.5 G/DL (ref 6.4–8.2)
PROT UR STRIP.AUTO-MCNC: NEGATIVE MG/DL
RBC # BLD AUTO: 5.47 X10(6)UL
RSV RNA SPEC NAA+PROBE: NEGATIVE
SARS-COV-2 RNA RESP QL NAA+PROBE: NOT DETECTED
SODIUM SERPL-SCNC: 134 MMOL/L (ref 136–145)
SP GR UR STRIP.AUTO: 1.01 (ref 1–1.03)
UROBILINOGEN UR STRIP.AUTO-MCNC: NORMAL MG/DL
WBC # BLD AUTO: 13.2 X10(3) UL (ref 4–11)

## 2024-04-28 PROCEDURE — 99285 EMERGENCY DEPT VISIT HI MDM: CPT

## 2024-04-28 PROCEDURE — 96361 HYDRATE IV INFUSION ADD-ON: CPT

## 2024-04-28 PROCEDURE — 80053 COMPREHEN METABOLIC PANEL: CPT | Performed by: EMERGENCY MEDICINE

## 2024-04-28 PROCEDURE — 99215 OFFICE O/P EST HI 40 MIN: CPT | Performed by: NURSE PRACTITIONER

## 2024-04-28 PROCEDURE — 93000 ELECTROCARDIOGRAM COMPLETE: CPT | Performed by: NURSE PRACTITIONER

## 2024-04-28 PROCEDURE — 81001 URINALYSIS AUTO W/SCOPE: CPT | Performed by: EMERGENCY MEDICINE

## 2024-04-28 PROCEDURE — C9113 INJ PANTOPRAZOLE SODIUM, VIA: HCPCS | Performed by: EMERGENCY MEDICINE

## 2024-04-28 PROCEDURE — 76700 US EXAM ABDOM COMPLETE: CPT | Performed by: EMERGENCY MEDICINE

## 2024-04-28 PROCEDURE — 96375 TX/PRO/DX INJ NEW DRUG ADDON: CPT

## 2024-04-28 PROCEDURE — 85025 COMPLETE CBC W/AUTO DIFF WBC: CPT | Performed by: EMERGENCY MEDICINE

## 2024-04-28 PROCEDURE — 74177 CT ABD & PELVIS W/CONTRAST: CPT | Performed by: EMERGENCY MEDICINE

## 2024-04-28 PROCEDURE — 0241U SARS-COV-2/FLU A AND B/RSV BY PCR (GENEXPERT): CPT | Performed by: EMERGENCY MEDICINE

## 2024-04-28 PROCEDURE — 81025 URINE PREGNANCY TEST: CPT

## 2024-04-28 PROCEDURE — 83690 ASSAY OF LIPASE: CPT | Performed by: EMERGENCY MEDICINE

## 2024-04-28 PROCEDURE — S0119 ONDANSETRON 4 MG: HCPCS | Performed by: NURSE PRACTITIONER

## 2024-04-28 PROCEDURE — 96374 THER/PROPH/DIAG INJ IV PUSH: CPT

## 2024-04-28 RX ORDER — MIDAZOLAM HYDROCHLORIDE 1 MG/ML
1 INJECTION INTRAMUSCULAR; INTRAVENOUS ONCE
Status: COMPLETED | OUTPATIENT
Start: 2024-04-28 | End: 2024-04-28

## 2024-04-28 RX ORDER — ONDANSETRON 2 MG/ML
4 INJECTION INTRAMUSCULAR; INTRAVENOUS ONCE
Status: COMPLETED | OUTPATIENT
Start: 2024-04-28 | End: 2024-04-28

## 2024-04-28 RX ORDER — ONDANSETRON 4 MG/1
4 TABLET, ORALLY DISINTEGRATING ORAL ONCE
Status: COMPLETED | OUTPATIENT
Start: 2024-04-28 | End: 2024-04-28

## 2024-04-28 RX ORDER — METOCLOPRAMIDE 10 MG/1
10 TABLET ORAL 3 TIMES DAILY PRN
Qty: 20 TABLET | Refills: 0 | Status: SHIPPED | OUTPATIENT
Start: 2024-04-28 | End: 2024-05-28

## 2024-04-28 RX ORDER — DIPHENHYDRAMINE HYDROCHLORIDE 50 MG/ML
25 INJECTION INTRAMUSCULAR; INTRAVENOUS ONCE
Status: COMPLETED | OUTPATIENT
Start: 2024-04-28 | End: 2024-04-28

## 2024-04-28 RX ORDER — METOCLOPRAMIDE HYDROCHLORIDE 5 MG/ML
10 INJECTION INTRAMUSCULAR; INTRAVENOUS ONCE
Status: COMPLETED | OUTPATIENT
Start: 2024-04-28 | End: 2024-04-28

## 2024-04-28 RX ORDER — PANTOPRAZOLE SODIUM 40 MG/1
40 TABLET, DELAYED RELEASE ORAL DAILY
Qty: 30 TABLET | Refills: 0 | Status: SHIPPED | OUTPATIENT
Start: 2024-04-28 | End: 2024-05-28

## 2024-04-28 RX ORDER — KETOROLAC TROMETHAMINE 15 MG/ML
15 INJECTION, SOLUTION INTRAMUSCULAR; INTRAVENOUS ONCE
Status: COMPLETED | OUTPATIENT
Start: 2024-04-28 | End: 2024-04-28

## 2024-04-28 NOTE — ED INITIAL ASSESSMENT (HPI)
Pt began this am around 5 am with severe abdominal pain around her umbilicus, that has produced nausea vomiting and dizziness.  Feels chilled

## 2024-04-28 NOTE — ED INITIAL ASSESSMENT (HPI)
Patient presents for evaluation of upper abdominal pain, chills, and vomiting that started this morning.

## 2024-04-28 NOTE — ED PROVIDER NOTES
Patient Seen in: [facility]      History     Chief Complaint   Patient presents with    Abdomen/Flank Pain     Stated Complaint: Vomiting    Subjective:   HPI  28-year-old with allergic rhinitis, depression, thyroid disorder, who is a smoker with no abdominal surgical history presents with upper abdominal pain with nausea and vomiting that started early this a.m.  She states originally the first 2 episodes of vomiting were food and now they are bile.  She denies any diarrhea.    Objective:   Past Medical History:    Allergic rhinitis    Anxiety    Candidiasis of vulva and vagina    Depression    Disorder of thyroid    Dry skin dermatitis    Hypothyroidism    Obesity    Personal history of neurosis    Prolonged depressive reaction    Smoker    Visual impairment    glasses              Past Surgical History:   Procedure Laterality Date    Thyroidectomy post prev thyr surg  2017                Social History     Socioeconomic History    Marital status: Single   Tobacco Use    Smoking status: Former     Current packs/day: 0.00     Average packs/day: 0.3 packs/day for 7.0 years (1.8 ttl pk-yrs)     Types: Cigarettes     Start date: 2013     Quit date: 2020     Years since quittin.2    Smokeless tobacco: Never   Vaping Use    Vaping status: Former    Start date: 2020    Substances: Nicotine    Devices: Disposable   Substance and Sexual Activity    Alcohol use: Yes     Alcohol/week: 0.0 standard drinks of alcohol     Comment: rarely    Drug use: Yes     Frequency: 7.0 times per week     Types: Cannabis   Other Topics Concern     Service No    Blood Transfusions No    Caffeine Concern Yes     Comment: 1 coffee, 1 tea, & 1-2 soda daily    Occupational Exposure No    Hobby Hazards No    Sleep Concern No    Stress Concern Yes    Weight Concern Yes    Special Diet No    Back Care No    Exercise Yes     Comment: 3 times weekly    Bike Helmet No    Seat Belt No    Self-Exams No               Review of Systems   All other systems reviewed and are negative.      Positive for stated complaint: Vomiting  Other systems are as noted in HPI.  Constitutional and vital signs reviewed.      All other systems reviewed and negative except as noted above.    Physical Exam     ED Triage Vitals [04/28/24 1507]   BP (!) 141/112   Pulse 119   Resp 24   Temp 97.1 °F (36.2 °C)   Temp src Temporal   SpO2 98 %   O2 Device None (Room air)       Current:BP (!) 141/112   Pulse 119   Temp 97.1 °F (36.2 °C) (Temporal)   Resp 24   SpO2 98%         Physical Exam  Vitals and nursing note reviewed.   Constitutional:       General: She is not in acute distress.     Appearance: She is well-developed. She is not ill-appearing or toxic-appearing.   Cardiovascular:      Rate and Rhythm: Regular rhythm. Tachycardia present.      Heart sounds: Normal heart sounds.   Pulmonary:      Effort: Pulmonary effort is normal.      Breath sounds: Normal breath sounds.   Abdominal:      Tenderness: There is abdominal tenderness in the right upper quadrant, epigastric area and left upper quadrant.   Skin:     General: Skin is warm and dry.   Neurological:      Mental Status: She is alert and oriented to person, place, and time.            ED Course   Labs Reviewed - No data to display  EKG    Rate, intervals and axes as noted on EKG Report.  Rate: 119  Rhythm: Sinus tachycardia  Reading: No acute ST changes                        MDM     Medical Decision Making  28-year-old with allergic rhinitis, depression, thyroid disorder, who is a smoker with no abdominal surgical history presents with upper abdominal pain with nausea and vomiting that started early this a.m.  She states originally the first 2 episodes of vomiting were food and now they are bile.  She denies any diarrhea.    Patient with upper abdominal tenderness and nausea and vomiting.  We do not have imaging at this location.  Patient will go to the emergency room for further  evaluation.  EKG with no acute changes.        Disposition and Plan     Clinical Impression:  1. Abdominal pain of unknown etiology    2. Nausea and vomiting in adult         Disposition:  Ic to ed  4/28/2024  3:21 pm    Follow-up:  No follow-up provider specified.        Medications Prescribed:  Current Discharge Medication List

## 2024-04-28 NOTE — ED PROVIDER NOTES
Patient Seen in: [facility] Emergency Department      History     Chief Complaint   Patient presents with    Abdomen/Flank Pain    Nausea/Vomiting/Diarrhea     Stated Complaint: abd pain vomiting    Subjective:   HPI    28-year-old female with a past medical history as below including anxiety/depression presents from urgent care due to epigastric pain, nausea and vomiting starting this morning.  Patient reports 2 episodes of NBNB emesis along with multiple episodes of retching and dry heaving.  She denies diarrhea.  Patient states she does have significant anxiety that she feels in her stomach that causes some nausea at times.  She states she is moving some marijuana to see if it may help.  She denies diarrhea.  Denies cough or cold symptoms.  Denies eating any raw or undercooked food.    Objective:   Past Medical History:    Allergic rhinitis    Anxiety    Candidiasis of vulva and vagina    Depression    Disorder of thyroid    Dry skin dermatitis    Hypothyroidism    Obesity    Personal history of neurosis    Prolonged depressive reaction    Smoker    Visual impairment    glasses              Past Surgical History:   Procedure Laterality Date    Thyroidectomy post prev thyr surg  2017                Social History     Socioeconomic History    Marital status: Single   Tobacco Use    Smoking status: Former     Current packs/day: 0.00     Average packs/day: 0.3 packs/day for 7.0 years (1.8 ttl pk-yrs)     Types: Cigarettes     Start date: 2013     Quit date: 2020     Years since quittin.2    Smokeless tobacco: Never   Vaping Use    Vaping status: Former    Start date: 2020    Substances: Nicotine    Devices: Disposable   Substance and Sexual Activity    Alcohol use: Yes     Alcohol/week: 0.0 standard drinks of alcohol     Comment: rarely    Drug use: Yes     Frequency: 7.0 times per week     Types: Cannabis   Other Topics Concern     Service No    Blood Transfusions No    Caffeine  Concern Yes     Comment: 1 coffee, 1 tea, & 1-2 soda daily    Occupational Exposure No    Hobby Hazards No    Sleep Concern No    Stress Concern Yes    Weight Concern Yes    Special Diet No    Back Care No    Exercise Yes     Comment: 3 times weekly    Bike Helmet No    Seat Belt No    Self-Exams No              Review of Systems    Positive for stated complaint: abd pain vomiting  Other systems are as noted in HPI.  Constitutional and vital signs reviewed.      All other systems reviewed and negative except as noted above.    Physical Exam     ED Triage Vitals [04/28/24 1604]   /86   Pulse 120   Resp (!) 28   Temp 97.9 °F (36.6 °C)   Temp src Oral   SpO2 99 %   O2 Device None (Room air)       Current:/79   Pulse 99   Temp 97.6 °F (36.4 °C) (Oral)   Resp 22   Ht 175.3 cm (5' 9\")   Wt 113.4 kg   SpO2 97%   BMI 36.92 kg/m²         Physical Exam  Vitals and nursing note reviewed.   Constitutional:       Appearance: She is well-developed.   HENT:      Head: Normocephalic and atraumatic.      Mouth/Throat:      Mouth: Mucous membranes are moist.   Eyes:      General: No scleral icterus.  Cardiovascular:      Rate and Rhythm: Regular rhythm. Tachycardia present.   Pulmonary:      Effort: Pulmonary effort is normal.      Breath sounds: Normal breath sounds.   Abdominal:      General: There is no distension.      Palpations: Abdomen is soft.      Tenderness: There is abdominal tenderness. There is no guarding or rebound.      Comments: Diffuse upper abdominal tenderness greatest in the epigastrium   Skin:     General: Skin is warm and dry.   Neurological:      General: No focal deficit present.      Mental Status: She is alert and oriented to person, place, and time.      Cranial Nerves: No cranial nerve deficit.      Motor: No weakness.   Psychiatric:         Mood and Affect: Mood normal.         Behavior: Behavior normal.               ED Course     Labs Reviewed   COMP METABOLIC PANEL (14) - Abnormal;  Notable for the following components:       Result Value    Glucose 114 (*)     Sodium 134 (*)     Potassium 3.4 (*)     BUN 8 (*)     Alkaline Phosphatase 159 (*)     Total Protein 8.5 (*)     A/G Ratio 0.9 (*)     All other components within normal limits   URINALYSIS WITH CULTURE REFLEX - Abnormal; Notable for the following components:    Ketones Urine 40 (*)     Blood Urine 1+ (*)     RBC Urine 6-10 (*)     Bacteria Urine 3+ (*)     Squamous Epi. Cells Few (*)     All other components within normal limits   CBC W/ DIFFERENTIAL - Abnormal; Notable for the following components:    WBC 13.2 (*)     RBC 5.47 (*)     HGB 16.1 (*)     Neutrophil Absolute Prelim 11.47 (*)     Neutrophil Absolute 11.47 (*)     Lymphocyte Absolute 0.88 (*)     All other components within normal limits   LIPASE - Normal   POCT PREGNANCY URINE - Normal   SARS-COV-2/FLU A AND B/RSV BY PCR (GENEXPERT) - Normal    Narrative:     This test is intended for the qualitative detection and differentiation of SARS-CoV-2, influenza A, influenza B, and respiratory syncytial virus (RSV) viral RNA in nasopharyngeal or nares swabs from individuals suspected of respiratory viral infection consistent with COVID-19 by their healthcare provider. Signs and symptoms of respiratory viral infection due to SARS-CoV-2, influenza, and RSV can be similar.    Test performed using the Xpert Xpress SARS-CoV-2/FLU/RSV (real time RT-PCR)  assay on the GeneXpert instrument, [employer], [place] [address].   This test is being used under the Food and Drug Administration's Emergency Use Authorization.    The authorized Fact Sheet for Healthcare Providers for this assay is available upon request from the laboratory.   CBC WITH DIFFERENTIAL WITH PLATELET    Narrative:     The following orders were created for panel order CBC With Differential With Platelet.  Procedure                               Abnormality         Status                     ---------                                -----------         ------                     CBC W/ DIFFERENTIAL[[id number]]          Abnormal            Final result                 Please view results for these tests on the individual orders.   RAINBOW DRAW LAVENDER   RAINBOW DRAW LIGHT GREEN   RAINBOW DRAW BLUE   RAINBOW DRAW GOLD               CT ABDOMEN+PELVIS(CONTRAST ONLY)(CPT=74177)    Result Date: 4/28/2024  CONCLUSION:   1. The appendix is normal.  No evidence of an acute inflammatory process.  2. Scattered low-density lesions in the liver.  These are nonspecific.  Nonemergent outpatient MRI liver protocol with Dot[facility] may be done for further evaluation.    LOCATION:  [facility]   Dictated by (CST): Jovan Cope MD on 4/28/2024 at 8:47 PM     Finalized by (CST): Jovan Cope MD on 4/28/2024 at 8:54 PM       US ABDOMEN COMPLETE (CPT=76700)    Result Date: 4/28/2024  CONCLUSION:   1. Hyperechoic lesions in the liver likely representing meningiomas, however confirmation with nonemergent outpatient MRI liver protocol with Dotare[facility] is recommended.  2. No evidence of cholelithiasis or cholecystitis.    LOCATION:  [facility]    Dictated by (CST): Jovan Cope MD on 4/28/2024 at 7:40 PM     Finalized by (CST): Jovan Cope MD on 4/28/2024 at 7:43 PM             MDM   28-year-old female with a past medical history as below including anxiety/depression presents from urgent care due to epigastric pain, nausea and vomiting starting this morning.      Differential includes but is not limited to gastritis, PUD, cannabinoid hyperemesis, pancreatitis, cholelithiasis/cholecystitis, dehydration, electrolyte abnormality    Labs show mildly elevated WBC 13.2 along with mild hyponatremia, normal renal function.  Lipase and LFTs are normal.    Independent interpretation of right upper ultrasound shows no evidence of gallstones.  Radiology report reviewed as above noting some hyperechoic lesions likely representing hemangiomas.    Patient was treated with  Toradol, Zofran and IV fluids with some improvement in symptoms but continued to be nauseated and have some continued upper abdominal pain.    CT of the abdomen/pelvis was obtained with results as below noting normal appendix and no acute inflammatory process.    Patient was treated with Reglan and Benadryl with significant improvement in symptoms.  She is able to tolerate p.o. fluids without any further vomiting.  Unclear etiology of symptoms which may be due to gastritis, foodborne illness or cannabinoid hyperemesis.    Patient was informed of liver lesions which are likely hemangiomas but radiology did recommend nonemergent evaluation with MRI if indicated.  Patient informed of these findings and need for follow-up with her PCP for further workup.  Patient verbalized understanding.    Will DC home with Rx for Reglan and Protonix.  Instructed to follow-up with PCP in 2 to 3 days.  Return precaution discussed.    Medical Decision Making  Amount and/or Complexity of Data Reviewed  Labs: ordered. Decision-making details documented in ED Course.  Radiology: ordered and independent interpretation performed. Decision-making details documented in ED Course.  ECG/medicine tests: ordered and independent interpretation performed. Decision-making details documented in ED Course.    Risk  Prescription drug management.        Disposition and Plan     Clinical Impression:  1. Epigastric pain    2. Nausea and vomiting in adult    3. Lesion of liver         Disposition:  Discharge  4/28/2024  9:06 pm    Follow-up:  Alison Paul [place]  [id number] [address] [address] [phone]    Schedule an appointment as soon as possible for a visit in 2 day(s)            Medications Prescribed:  Current Discharge Medication List        START taking these medications    Details   metoclopramide 10 MG Oral Tab Take 1 tablet (10 mg total) by mouth 3 (three) times daily as needed.  Qty: 20 tablet, Refills: 0      pantoprazole 40  MG Oral Tab EC Take 1 tablet (40 mg total) by mouth daily.  Qty: 30 tablet, Refills: 0

## 2024-04-29 NOTE — ED QUICK NOTES
Rounding Completed. Report received from DEYA Martinez    Plan of Care reviewed. Waiting for US results.  Elimination needs assessed.  Provided updates.    Bed is locked and in lowest position. Call light within reach.

## 2024-04-30 ENCOUNTER — TELEPHONE (OUTPATIENT)
Dept: FAMILY MEDICINE CLINIC | Facility: CLINIC | Age: 29
End: 2024-04-30

## 2024-04-30 LAB
ATRIAL RATE: 119 BPM
P AXIS: 22 DEGREES
P-R INTERVAL: 126 MS
Q-T INTERVAL: 348 MS
QRS DURATION: 78 MS
QTC CALCULATION (BEZET): 489 MS
R AXIS: 59 DEGREES
T AXIS: 7 DEGREES
VENTRICULAR RATE: 119 BPM

## 2024-04-30 NOTE — TELEPHONE ENCOUNTER
Patient scheduled an appointment thru My-Chart for 5/23/24 and stated in the appointment notes Sinus issues and I was in the ER on 04/28 with stomach pain. They asked me to follow up with my PCP . Please advise if patient is OK to wait for appointment or needs to be seen sooner.     Future Appointments   Date Time Provider Department Center   5/23/2024 12:30 PM Fina Winters PA-C [facility] [place]

## 2024-06-24 DIAGNOSIS — E89.0 POSTOPERATIVE HYPOTHYROIDISM: ICD-10-CM

## 2024-06-26 RX ORDER — LEVOTHYROXINE SODIUM 137 UG/1
137 TABLET ORAL DAILY
Qty: 30 TABLET | Refills: 0 | Status: SHIPPED | OUTPATIENT
Start: 2024-06-26 | End: 2024-08-05

## 2024-07-21 DIAGNOSIS — E78.2 MIXED HYPERLIPIDEMIA: ICD-10-CM

## 2024-07-22 RX ORDER — ROSUVASTATIN CALCIUM 5 MG/1
5 TABLET, COATED ORAL NIGHTLY
Qty: 30 TABLET | Refills: 0 | Status: SHIPPED | OUTPATIENT
Start: 2024-07-22

## 2024-07-22 RX ORDER — LEVONORGESTREL/ETHIN.ESTRADIOL 0.1-0.02MG
1 TABLET ORAL DAILY
Qty: 28 TABLET | Refills: 0 | Status: SHIPPED | OUTPATIENT
Start: 2024-07-22

## 2024-08-05 DIAGNOSIS — E89.0 POSTOPERATIVE HYPOTHYROIDISM: ICD-10-CM

## 2024-08-05 RX ORDER — LEVOTHYROXINE SODIUM 137 UG/1
137 TABLET ORAL DAILY
Qty: 30 TABLET | Refills: 0 | Status: SHIPPED | OUTPATIENT
Start: 2024-08-05

## 2024-08-05 NOTE — TELEPHONE ENCOUNTER
Requested Prescriptions     Pending Prescriptions Disp Refills    LEVOTHYROXINE 137 MCG Oral Tab [Pharmacy Med Name: LEVOTHYROXINE 0.137MG (137MCG) TAB] 30 tablet 0     Sig: TAKE 1 TABLET BY MOUTH DAILY        Last refill: 6/26/24 30 tabs 0 refills    Last Appointment: 7/19/23    Next Appointment: 8/22/24    Labs needed

## 2024-08-22 ENCOUNTER — OFFICE VISIT (OUTPATIENT)
Dept: FAMILY MEDICINE CLINIC | Facility: CLINIC | Age: 29
End: 2024-08-22
Payer: MEDICAID

## 2024-08-22 VITALS
BODY MASS INDEX: 38.66 KG/M2 | WEIGHT: 261 LBS | DIASTOLIC BLOOD PRESSURE: 68 MMHG | HEART RATE: 88 BPM | SYSTOLIC BLOOD PRESSURE: 112 MMHG | HEIGHT: 69 IN

## 2024-08-22 DIAGNOSIS — Z12.4 PAP SMEAR FOR CERVICAL CANCER SCREENING: ICD-10-CM

## 2024-08-22 DIAGNOSIS — Z13.6 ENCOUNTER FOR LIPID SCREENING FOR CARDIOVASCULAR DISEASE: ICD-10-CM

## 2024-08-22 DIAGNOSIS — E78.2 MIXED HYPERLIPIDEMIA: ICD-10-CM

## 2024-08-22 DIAGNOSIS — E89.0 POSTOPERATIVE HYPOTHYROIDISM: ICD-10-CM

## 2024-08-22 DIAGNOSIS — Z13.228 SCREENING FOR ENDOCRINE, NUTRITIONAL, METABOLIC AND IMMUNITY DISORDER: ICD-10-CM

## 2024-08-22 DIAGNOSIS — E66.1 CLASS 2 DRUG-INDUCED OBESITY WITHOUT SERIOUS COMORBIDITY WITH BODY MASS INDEX (BMI) OF 38.0 TO 38.9 IN ADULT: ICD-10-CM

## 2024-08-22 DIAGNOSIS — Z01.419 WELL FEMALE EXAM WITH ROUTINE GYNECOLOGICAL EXAM: Primary | ICD-10-CM

## 2024-08-22 DIAGNOSIS — F40.10 SOCIAL ANXIETY DISORDER: ICD-10-CM

## 2024-08-22 DIAGNOSIS — Z13.21 SCREENING FOR ENDOCRINE, NUTRITIONAL, METABOLIC AND IMMUNITY DISORDER: ICD-10-CM

## 2024-08-22 DIAGNOSIS — F33.1 MODERATE RECURRENT MAJOR DEPRESSION (HCC): ICD-10-CM

## 2024-08-22 DIAGNOSIS — R93.2 ABNORMAL LIVER CT: ICD-10-CM

## 2024-08-22 DIAGNOSIS — R74.8 ELEVATED ALKALINE PHOSPHATASE LEVEL: ICD-10-CM

## 2024-08-22 DIAGNOSIS — F41.1 GAD (GENERALIZED ANXIETY DISORDER): ICD-10-CM

## 2024-08-22 DIAGNOSIS — Z79.899 MEDICATION MANAGEMENT: ICD-10-CM

## 2024-08-22 DIAGNOSIS — Z13.0 SCREENING FOR ENDOCRINE, NUTRITIONAL, METABOLIC AND IMMUNITY DISORDER: ICD-10-CM

## 2024-08-22 DIAGNOSIS — Z86.59 HISTORY OF SUICIDAL IDEATION: ICD-10-CM

## 2024-08-22 DIAGNOSIS — Z13.220 ENCOUNTER FOR LIPID SCREENING FOR CARDIOVASCULAR DISEASE: ICD-10-CM

## 2024-08-22 DIAGNOSIS — Z13.29 SCREENING FOR ENDOCRINE, NUTRITIONAL, METABOLIC AND IMMUNITY DISORDER: ICD-10-CM

## 2024-08-22 PROCEDURE — 99395 PREV VISIT EST AGE 18-39: CPT | Performed by: FAMILY MEDICINE

## 2024-08-22 PROCEDURE — 88175 CYTOPATH C/V AUTO FLUID REDO: CPT | Performed by: FAMILY MEDICINE

## 2024-08-22 PROCEDURE — 87624 HPV HI-RISK TYP POOLED RSLT: CPT | Performed by: FAMILY MEDICINE

## 2024-08-22 PROCEDURE — 99213 OFFICE O/P EST LOW 20 MIN: CPT | Performed by: FAMILY MEDICINE

## 2024-08-22 RX ORDER — ROSUVASTATIN CALCIUM 5 MG/1
5 TABLET, COATED ORAL NIGHTLY
Qty: 90 TABLET | Refills: 3 | Status: SHIPPED | OUTPATIENT
Start: 2024-08-22

## 2024-08-22 RX ORDER — QUETIAPINE FUMARATE 100 MG/1
100 TABLET, FILM COATED ORAL NIGHTLY
COMMUNITY
Start: 2024-07-17

## 2024-08-22 RX ORDER — LEVONORGESTREL/ETHIN.ESTRADIOL 0.1-0.02MG
1 TABLET ORAL DAILY
Qty: 84 TABLET | Refills: 3 | Status: SHIPPED | OUTPATIENT
Start: 2024-08-22

## 2024-08-22 NOTE — PROGRESS NOTES
HPI:   Sahara Gibbs is a 28 year old female who presents for a complete physical exam.       -- Mass liver incidental finding on imaging  Patient was seen in the emergency department for epigastric pain no longer has issues with pain  Review of chart/ from 4/28/24 ultrasound abdomen and CT abdomen pelvis done in the emergency department  Patient is asymptomatic no abdominal pain no nausea no vomiting no diarrhea  4/28/24 CMP elevated alkaline phosphatase at 159 rest LFT normal elevated total protein 8.5 needs repeat testing  mpression   CONCLUSION: CT abdomen pelvis1. The appendix is normal.  No evidence of an acute inflammatory process.     2. Scattered low-density lesions in the liver.  These are nonspecific.  Nonemergent outpatient MRI liver protocol with [facility] may be done for further evaluation.        LOCATION:  [place]   Dictated by (CST): Jovan Cope MD on 4/28/2024   CONCLUSION: Ultrasound abdomen done 4/28/2024     1. Hyperechoic lesions in the liver likely representing meningiomas, however confirmation with nonemergent outpatient MRI liver protocol with [facility] is recommended.      2. No evidence of cholelithiasis or cholecystitis.       Depression/ELI   Managed by psychiatrist and psychologist.  PHQ9   18  Follows psychiatrist every 1 months was recently in a outpatient rehab 1 week ago was there for 2 weeks   Was doing well and then experienced worsening mood after moving back in with boyfriend who has mental illness as well.  She states that she has passive suicidal ideations but no active suicidal ideations.  Did call her counselor today is waiting for a phone call back.  New prescription is quetiapine 100 mg at night does find it beneficial for her sleep.    Counselor is every 2 weeks   Still on clonazepam fluoxetine and BuSpar    --- Hypothyroidism  Had goiter surgically removed was benign present dose of levothyroxine is 137 mcg well-tolerated no side effects.  Last TSH was 6/27/2023  and was 1.1  Patient does have severe anxiety and depression followed by psychiatrist psychologist.  Weight gain and dry skin which has been chronic  Weight today is 261 pounds in April 250 pounds.    CPE  Social history lives in her house in [place] that she owns with her boyfriend occupation is a  did not do well in a structured work environment due to severe anxiety does not work well with people due to her anxiety.  Is trying again for disability secondary to severe anxiety with panic disorder, quit smoking 2020      GYNE  Symptoms: denies discharge, itching, burning or dysuria, periods are regular, no periods skipping through placebo due to cramping so avoids periods and emotions also    Abnormal pap none done 5/21/21 all normal in the past .  Sexually active in the past with her boyfriend of 6 years presently not active sexually lives with him due mental illness  HPV completed vaccine series    COVID vaccine  Done with Booster 2/6/23;  Tdap 3/15/2018  Smoked quit 2020 smoked since 16   Alc rare  THC daily 3-4 times a day   Dental exams [facility]   Eye exams last done 2020 vision is stable  Derm several years ago   Sleep Apnea  No witnessed no snoring   Exercise  Dog walking daily 20 minutes 5-6 times a day     DIet stress eating, snaking processed food    FH  PGF colon cancer not aware of FH CAD  Last colonoscopy never; PGF colon cancer and dad polyps.  Last mammogram never; no FH breast cancer no ovarian cancer   Sleep or emotional difficulty  Yes sees psychcologist weekly did an outpatient program; psychiatrist 1 monthly   STD history never  Immunization  Tetanus 2018    OCP Refill   Normal periods   Request refill  No family history of clots no DVT, PE personal or family history.    Patient stopped smoking since 2020 vapping       Severe Keratosis prior dermatologist  Dr Ross. No cure uses emolliant no longer seeing dermatologist due to insurance        Depression Screening  (PHQ-2/PHQ-9): Over the LAST 2 WEEKS   Little interest or pleasure in doing things: More than half the days    Feeling down, depressed, or hopeless: More than half the days    PHQ-2 SCORE: 4   1. Little interest or pleasure in doing things: More than half the days  2. Feeling down, depressed, or hopeless: More than half the days  3. Trouble falling or staying asleep, or sleeping too much: More than half the days  4. Feeling tired or having little energy: More than half the days  5. Poor appetite or overeating: More than half the days  6. Feeling bad about yourself - or that you are a failure or have let yourself or your family down: Nearly every day  7. Trouble concentrating on things, such as reading the newspaper or watching television: More than half the days  8. Moving or speaking so slowly that other people could have noticed. Or the opposite - being so fidgety or restless that you have been moving around a lot more than usual: Several days  9. Thoughts that you would be better off dead, or of hurting yourself in some way: More than half the days  PHQ-9 TOTAL SCORE: 18  If you checked off any problems, how difficult have these problems made it for you to do your work, take care of things at home, or get along with other people?: Very difficult      [place] Suicide Severity Rating Scale Screener   In what setting is the screener performed?: an office visit  1. Have you wished you were dead or wished you could go to sleep and not wake up? (past 30 days): Yes  2. Have you actually had any thoughts of killing yourself? (past 30 days): No  6. Have you ever done anything, started to do anything, or prepared to do anything to end your life? (lifetime): No  Score and Suggested Actions - Office Visit: 1- Low Risk:  Encourage the patient to initiate behavioral health services with a LYUBOV Navigation Order or BHI order.  Educate patient to call [facility] at [phone] if symptoms worsen.  Score and  Intervention  Score and Suggested Actions - Office Visit: 1- Low Risk:  Encourage the patient to initiate behavioral health services with a LYUBOV Navigation Order or BHI order.  Educate patient to call [facility] at [phone] if symptoms worsen.      Wt Readings from Last 6 Encounters:   08/22/24 261 lb (118.4 kg)   04/28/24 250 lb (113.4 kg)   07/05/23 256 lb (116.1 kg)   05/30/23 255 lb (115.7 kg)   07/25/22 241 lb (109.3 kg)   05/23/22 249 lb (112.9 kg)     Body mass index is 38.54 kg/m².       Results for orders placed or performed during the hospital encounter of 04/28/24   Comp Metabolic Panel (14)   Result Value Ref Range    Glucose 114 (H) 70 - 99 mg/dL    Sodium 134 (L) 136 - 145 mmol/L    Potassium 3.4 (L) 3.5 - 5.1 mmol/L    Chloride 105 98 - 112 mmol/L    CO2 21.0 21.0 - 32.0 mmol/L    Anion Gap 8 0 - 18 mmol/L    BUN 8 (L) 9 - 23 mg/dL    Creatinine 1.00 0.55 - 1.02 mg/dL    Calcium, Total 10.1 8.5 - 10.1 mg/dL    Calculated Osmolality 277 275 - 295 mOsm/kg    eGFR-Cr 78 >=60 mL/min/1.73m2    AST 31 15 - 37 U/L    ALT 43 13 - 56 U/L    Alkaline Phosphatase 159 (H) 37 - 98 U/L    Bilirubin, Total 1.4 0.1 - 2.0 mg/dL    Total Protein 8.5 (H) 6.4 - 8.2 g/dL    Albumin 4.1 3.4 - 5.0 g/dL    Globulin  4.4 2.8 - 4.4 g/dL    A/G Ratio 0.9 (L) 1.0 - 2.0   Urinalysis with Culture Reflex    Specimen: Urine, clean catch   Result Value Ref Range    Urine Color Light-Yellow Yellow    Clarity Urine Clear Clear    Spec Gravity 1.010 1.005 - 1.030    Glucose Urine Normal Normal mg/dL    Bilirubin Urine Negative Negative    Ketones Urine 40 (A) Negative mg/dL    Blood Urine 1+ (A) Negative    pH Urine 8.0 5.0 - 8.0    Protein Urine Negative Negative mg/dL    Urobilinogen Urine Normal Normal mg/dL    Nitrite Urine Negative Negative    Leukocyte Esterase Urine Negative Negative    WBC Urine 1-5 0 - 5 /HPF    RBC Urine 6-10 (A) 0 - 2 /HPF    Bacteria Urine 3+ (A) None Seen /HPF    Squamous Epi. Cells Few (A)  None Seen /HPF    Renal Tubular Epithelial Cells None Seen None Seen /HPF    Transitional Cells None Seen None Seen /HPF    Yeast Urine None Seen None Seen /HPF   Lipase   Result Value Ref Range    Lipase 23 13 - 75 U/L   POCT Pregnancy, Urine   Result Value Ref Range    POCT Urine Pregnancy Negative Negative   RAINBOW DRAW LAVENDER   Result Value Ref Range    Hold Lavender Auto Resulted    RAINBOW DRAW LIGHT GREEN   Result Value Ref Range    Hold Lt Green Auto Resulted    RAINBOW DRAW BLUE   Result Value Ref Range    Hold Blue Auto Resulted    RAINBOW DRAW GOLD   Result Value Ref Range    Hold Gold Auto Resulted    SARS-CoV-2/Flu A and B/RSV by PCR (GeneXpert)    Specimen: Nares; Other   Result Value Ref Range    SARS-CoV-2 (COVID-19) - (GeneXpert) Not Detected Not Detected    Influenza A by PCR Negative Negative    Influenza B by PCR Negative Negative    RSV by PCR Negative Negative   CBC W/ DIFFERENTIAL   Result Value Ref Range    WBC 13.2 (H) 4.0 - 11.0 x10(3) uL    RBC 5.47 (H) 3.80 - 5.30 x10(6)uL    HGB 16.1 (H) 12.0 - 16.0 g/dL    HCT 45.8 35.0 - 48.0 %    .0 150.0 - 450.0 10(3)uL    MCV 83.7 80.0 - 100.0 fL    MCH 29.4 26.0 - 34.0 pg    MCHC 35.2 31.0 - 37.0 g/dL    RDW 12.6 %    Neutrophil Absolute Prelim 11.47 (H) 1.50 - 7.70 x10 (3) uL    Neutrophil Absolute 11.47 (H) 1.50 - 7.70 x10(3) uL    Lymphocyte Absolute 0.88 (L) 1.00 - 4.00 x10(3) uL    Monocyte Absolute 0.73 0.10 - 1.00 x10(3) uL    Eosinophil Absolute 0.00 0.00 - 0.70 x10(3) uL    Basophil Absolute 0.05 0.00 - 0.20 x10(3) uL    Immature Granulocyte Absolute 0.09 0.00 - 1.00 x10(3) uL    Neutrophil % 86.7 %    Lymphocyte % 6.7 %    Monocyte % 5.5 %    Eosinophil % 0.0 %    Basophil % 0.4 %    Immature Granulocyte % 0.7 %        Current Outpatient Medications   Medication Sig Dispense Refill    QUEtiapine 100 MG Oral Tab Take 1 tablet (100 mg total) by mouth nightly.      rosuvastatin 5 MG Oral Tab Take 1 tablet (5 mg total) by mouth  nightly. 90 tablet 3    Levonorgestrel-Ethinyl Estrad (VIENVA) 0.1-20 MG-MCG Oral Tab Take 1 tablet by mouth daily. 84 tablet 3    LEVOTHYROXINE 137 MCG Oral Tab TAKE 1 TABLET BY MOUTH DAILY 30 tablet 0    clonazePAM 0.5 MG Oral Tab Take a 1/2 tablet daily as needed (Patient taking differently: Take 1 tablet (0.5 mg total) by mouth daily. Take a 1/2 tablet daily as needed) 15 tablet 0    FLUoxetine HCl 40 MG Oral Cap Take 2 capsules (80 mg total) by mouth daily. 60 capsule 2    busPIRone HCl 30 MG Oral Tab Take 1 tablet (30 mg total) by mouth 2 (two) times daily. 60 tablet 1      Past Medical History:    Allergic rhinitis    Anxiety    Candidiasis of vulva and vagina    Depression    Disorder of thyroid    Dry skin dermatitis    Hypothyroidism    Obesity    Personal history of neurosis    Prolonged depressive reaction    Smoker    Visual impairment    glasses      Past Surgical History:   Procedure Laterality Date    Thyroidectomy post prev thyr surg  2017      Family History   Problem Relation Age of Onset    Hypertension Father     Diabetes Father     Obesity Mother     Diabetes Maternal Grandfather     Colon Cancer Paternal Grandmother     No Known Problems Sister       Social History:   Social History     Socioeconomic History    Marital status: Single   Tobacco Use    Smoking status: Former     Current packs/day: 0.00     Average packs/day: 0.3 packs/day for 7.0 years (1.8 ttl pk-yrs)     Types: Cigarettes     Start date: 2013     Quit date: 2020     Years since quittin.5    Smokeless tobacco: Never   Vaping Use    Vaping status: Former    Start date: 2020    Substances: Nicotine    Devices: Disposable   Substance and Sexual Activity    Alcohol use: Yes     Alcohol/week: 0.0 standard drinks of alcohol     Comment: rarely    Drug use: Yes     Frequency: 7.0 times per week     Types: Cannabis   Other Topics Concern     Service No    Blood Transfusions No    Caffeine Concern Yes      Comment: 1 coffee, 1 tea, & 1-2 soda daily    Occupational Exposure No    Hobby Hazards No    Sleep Concern No    Stress Concern Yes    Weight Concern Yes    Special Diet No    Back Care No    Exercise Yes     Comment: 3 times weekly    Bike Helmet No    Seat Belt No    Self-Exams No     Occ: works supervisor . : no. Children: no.   Exercise: once per week.  Diet: watches minimally     REVIEW OF SYSTEMS:   GENERAL: denies fevers, weakness, trouble sleeping has had weight gain   SKIN: stable severe hyperkeratosis  EYES:denies vision changes  HEENT: denies upper respiratory symptoms  LUNGS: denies cough or shortness of breath with exertion  CHEST:  denies breast changes or pain  CARDIOVASCULAR: denies chest pain or tightness on exertion: no edema  VASCULAR: denies leg cramps  GI: denies abdominal pain, bowel movement changes, blood in stool  : denies urinary problems, vaginal discharge or discomfort,  Periods regular  MUSCULOSKELETAL: denies joint pain or stiffness  NEURO: denies headaches, tingling or dizziness  PSYCHE: see above depression / anxiety  HEMATOLOGIC: denies bleeding abnormalities  ENDOCRINE: denies temperature intolerance, polyuria, or excessive sweating; history of hypothyroidism secondary to thyroidectomy benign.  LYMPHATICS: denies swollen glands      EXAM:   /68   Pulse 88   Ht 5' 9\" (1.753 m)   Wt 261 lb (118.4 kg)   BMI 38.54 kg/m²   Body mass index is 38.54 kg/m².   GENERAL: well developed, well nourished and in no apparent distress  SKIN: severe hyperkeratosis legs and arms saw dermatologist in the past no specific treatment is using emmollient with some mild improvement  HEENT: atraumatic, normocephalic,ears, nose and throat normal coronary on left side of mouth mild cheilitis   EYES: PERRLA, EOMI, sclera, conjunctiva are clear  NECK: supple,no adenopathy,no carotid bruits had thyroid removed  CHEST: no chest tenderness  BREAST: symmetrical, no suspicious mass, no nipple  dimpling or discharge.  LUNGS: clear to auscultation bilateral, no rales, rhonchi or wheezing  CARDIO: RRR without murmur normal S1S2  ABD:  normal bowel sounds,soft, non tender, no masses, HSM or tenderness  :  normal external labia without lesions or erythema, introitus and vaginal vault normal with no lesions, cervix no erythema, no lesions, normal discharge.  Pap smear obtained.  No cervical motion tenderness and no pelvic mass appreciated.    MUSCULOSKELETAL: gait alysha,l no gross M/S defect.  EXTREMITIES: no clubbing, cyanosis, or edema  NEURO: oriented times three, cranial nerves are grossly intact, no gross motor or sensory deficit.  Psych patient is very anxious her affect is blunted but she is friendly and is able to make normal conversation.  ASSESSMENT AND PLAN:   Sahara Gibbs is a 28 year old female who presents for a complete physical exam.    Encounter Diagnoses   Name Primary?    Well female exam with routine gynecological exam Yes    Mixed hyperlipidemia     Abnormal liver CT     Elevated alkaline phosphatase level     Postoperative hypothyroidism     Class 2 drug-induced obesity without serious comorbidity with body mass index (BMI) of 38.0 to 38.9 in adult     Social anxiety disorder     ELI (generalized anxiety disorder)     History of suicidal ideation     Moderate recurrent major depression (HCC)     Screening for endocrine, nutritional, metabolic and immunity disorder     Encounter for lipid screening for cardiovascular disease     Pap smear for cervical cancer screening     Medication management        Orders Placed This Encounter   Procedures    CBC With Differential With Platelet    Comp Metabolic Panel (14)    Lipid Panel    Hemoglobin A1C    TSH and Free T4    Vitamin B12 [E]    Hpv High Risk , Thin Prep Collection    Image-Guided Pap Smear ([facility])    ThinPrep PAP Smear       Serial Meds & Refills for this Visit:  Requested Prescriptions     Signed Prescriptions Disp Refills     rosuvastatin 5 MG Oral Tab 90 tablet 3     Sig: Take 1 tablet (5 mg total) by mouth nightly.    Levonorgestrel-Ethinyl Estrad (VIENVA) 0.1-20 MG-MCG Oral Tab 84 tablet 3     Sig: Take 1 tablet by mouth daily.       Imaging & Consults:  MRI ABDOMEN (CPT=74181)  1. Well female exam with routine gynecological exam  Pap and pelvic   .    Self breast exam explained. Health maintenance guidance given including vision and dental exams, vitamin D 1,000 iu daily with calcium 1000 mg daily.  Lifestyle guidance provided recommended low fat diet and aerobic exercise 30 minutes 3-4 times weekly.  Continue with follow-ups with psychiatrist and psychologist.  Continue birth control pills well-tolerated no side effects   use, risks and benefits of hormonal contraception discussed including blood clot that can go to the lungs, heart or brain and cause Heart Attack, Stroke and even Death. These risks are further increased with smoking. Stop smoking. Patient verbalizes understanding.      2. Mixed hyperlipidemia  Reviewed medication benefits and side effects.     - rosuvastatin 5 MG Oral Tab; Take 1 tablet (5 mg total) by mouth nightly.  Dispense: 90 tablet; Refill: 3    3. Abnormal liver CT  - MRI ABDOMEN (CPT=74181); Future    4. Elevated alkaline phosphatase level  - MRI ABDOMEN (CPT=74181); Future  - Comp Metabolic Panel (14)    5. Postoperative hypothyroidism  Medication refill can be given once thyroid testing is completed  - TSH and Free T4    6. Class 2 drug-induced obesity without serious comorbidity with body mass index (BMI) of 38.0 to 38.9 in adult  Weight loss discussed patient should start exercising daily for 30-40 minutes also reducing all carbohydrates both simple and complex.  Can eat fruits and vegetables and small frequent meals of healthy combinations of protein and carbohydrate.  Avoiding packaged/processed.       7. Social anxiety disorder  ELI (generalized anxiety disorder)  History of suicidal  ideation  Moderate recurrent major depression (HCC)  Presently undergoing more anxiety and stress since coming back with her boyfriend who has mental illness and  not working  She put a call in with her counselor to create a earlier appointment to discuss acute anxiety symptoms and depression.  Presently no active suicidal ideations passive symptoms of just not wanting to be around to deal with the stress.  Discussed boundaries with her boyfriend and to leave the house if feeling anxious to take a walk.  11. Screening for endocrine, nutritional, metabolic and immunity disorder  - CBC With Differential With Platelet  - Comp Metabolic Panel (14)  - Hemoglobin A1C  - TSH and Free T4  - Vitamin B12 [E]    12. Encounter for lipid screening for cardiovascular disease  - Lipid Panel    13. Pap smear for cervical cancer screening  - ThinPrep PAP Smear; Future  - Hpv High Risk , Thin Prep Collection; Future  - ThinPrep PAP Smear  - Hpv High Risk , Thin Prep Collection  - Image-Guided Pap Smear ([facility])    14. Medication management  As above management of OCP, thyroid medication and cholesterol medication        The patient indicates understanding of these issues and agrees to the plan.  The patient is asked to return for complete physical yearly.

## 2024-08-23 PROBLEM — E66.1 CLASS 2 DRUG-INDUCED OBESITY WITHOUT SERIOUS COMORBIDITY WITH BODY MASS INDEX (BMI) OF 38.0 TO 38.9 IN ADULT: Status: ACTIVE | Noted: 2024-08-23

## 2024-08-23 PROBLEM — E66.812 CLASS 2 DRUG-INDUCED OBESITY WITHOUT SERIOUS COMORBIDITY WITH BODY MASS INDEX (BMI) OF 38.0 TO 38.9 IN ADULT: Status: ACTIVE | Noted: 2024-08-23

## 2024-08-23 PROBLEM — R93.2 ABNORMAL LIVER CT: Status: ACTIVE | Noted: 2024-08-23

## 2024-08-23 LAB — HPV E6+E7 MRNA CVX QL NAA+PROBE: NEGATIVE

## 2024-08-29 LAB
.: NORMAL
.: NORMAL

## 2024-08-30 DIAGNOSIS — E89.0 POSTOPERATIVE HYPOTHYROIDISM: ICD-10-CM

## 2024-08-30 DIAGNOSIS — E78.2 MIXED HYPERLIPIDEMIA: Primary | ICD-10-CM

## 2024-08-30 LAB
ABSOLUTE BASOPHILS: 55 CELLS/UL (ref 0–200)
ABSOLUTE EOSINOPHILS: 200 CELLS/UL (ref 15–500)
ABSOLUTE LYMPHOCYTES: 2184 CELLS/UL (ref 850–3900)
ABSOLUTE MONOCYTES: 619 CELLS/UL (ref 200–950)
ABSOLUTE NEUTROPHILS: 6042 CELLS/UL (ref 1500–7800)
ALBUMIN/GLOBULIN RATIO: 1.5 (CALC) (ref 1–2.5)
ALBUMIN: 4.1 G/DL (ref 3.6–5.1)
ALKALINE PHOSPHATASE: 101 U/L (ref 31–125)
ALT: 8 U/L (ref 6–29)
AST: 10 U/L (ref 10–30)
BASOPHILS: 0.6 %
BILIRUBIN, TOTAL: 0.5 MG/DL (ref 0.2–1.2)
BUN: 8 MG/DL (ref 7–25)
CALCIUM: 9.6 MG/DL (ref 8.6–10.2)
CARBON DIOXIDE: 19 MMOL/L (ref 20–32)
CHLORIDE: 107 MMOL/L (ref 98–110)
CHOL/HDLC RATIO: 6.7 (CALC)
CHOLESTEROL, TOTAL: 214 MG/DL
CREATININE: 0.81 MG/DL (ref 0.5–0.97)
EGFR: 100 ML/MIN/1.73M2
EOSINOPHILS: 2.2 %
GLOBULIN: 2.7 G/DL (CALC) (ref 1.9–3.7)
GLUCOSE: 86 MG/DL (ref 65–99)
HDL CHOLESTEROL: 32 MG/DL
HEMATOCRIT: 44.5 % (ref 35–45)
HEMOGLOBIN A1C: 5.6 % OF TOTAL HGB
HEMOGLOBIN: 14.4 G/DL (ref 11.7–15.5)
LDL-CHOLESTEROL: 147 MG/DL (CALC)
LYMPHOCYTES: 24 %
MCH: 29.4 PG (ref 27–33)
MCHC: 32.4 G/DL (ref 32–36)
MCV: 91 FL (ref 80–100)
MONOCYTES: 6.8 %
MPV: 9.9 FL (ref 7.5–12.5)
NEUTROPHILS: 66.4 %
NON-HDL CHOLESTEROL: 182 MG/DL (CALC)
PLATELET COUNT: 340 THOUSAND/UL (ref 140–400)
POTASSIUM: 4.2 MMOL/L (ref 3.5–5.3)
PROTEIN, TOTAL: 6.8 G/DL (ref 6.1–8.1)
RDW: 12.3 % (ref 11–15)
RED BLOOD CELL COUNT: 4.89 MILLION/UL (ref 3.8–5.1)
SODIUM: 137 MMOL/L (ref 135–146)
T4, FREE: 1 NG/DL (ref 0.8–1.8)
TRIGLYCERIDES: 201 MG/DL
TSH: 0.36 MIU/L
VITAMIN B12: 375 PG/ML (ref 200–1100)
WHITE BLOOD CELL COUNT: 9.1 THOUSAND/UL (ref 3.8–10.8)

## 2024-08-30 RX ORDER — LEVOTHYROXINE SODIUM 137 UG/1
137 TABLET ORAL DAILY
Qty: 30 TABLET | Refills: 1 | Status: SHIPPED | OUTPATIENT
Start: 2024-08-30

## 2024-08-30 RX ORDER — ROSUVASTATIN CALCIUM 10 MG/1
10 TABLET, COATED ORAL NIGHTLY
Qty: 90 TABLET | Refills: 1 | Status: SHIPPED | OUTPATIENT
Start: 2024-08-30

## 2024-11-03 DIAGNOSIS — E89.0 POSTOPERATIVE HYPOTHYROIDISM: ICD-10-CM

## 2024-11-04 RX ORDER — LEVOTHYROXINE SODIUM 137 UG/1
137 TABLET ORAL DAILY
Qty: 30 TABLET | Refills: 1 | Status: SHIPPED | OUTPATIENT
Start: 2024-11-04

## 2024-11-06 LAB
ALBUMIN/GLOBULIN RATIO: 1.4 (CALC) (ref 1–2.5)
ALBUMIN: 4.1 G/DL (ref 3.6–5.1)
ALKALINE PHOSPHATASE: 115 U/L (ref 31–125)
ALT: 7 U/L (ref 6–29)
AST: 10 U/L (ref 10–30)
BILIRUBIN, DIRECT: 0.1 MG/DL
BILIRUBIN, INDIRECT: 0.7 MG/DL (CALC) (ref 0.2–1.2)
BILIRUBIN, TOTAL: 0.8 MG/DL (ref 0.2–1.2)
CHOL/HDLC RATIO: 5.2 (CALC)
CHOLESTEROL, TOTAL: 166 MG/DL
GLOBULIN: 2.9 G/DL (CALC) (ref 1.9–3.7)
HDL CHOLESTEROL: 32 MG/DL
LDL-CHOLESTEROL: 107 MG/DL (CALC)
NON-HDL CHOLESTEROL: 134 MG/DL (CALC)
PROTEIN, TOTAL: 7 G/DL (ref 6.1–8.1)
T4, FREE: 1 NG/DL (ref 0.8–1.8)
TRIGLYCERIDES: 154 MG/DL
TSH: 0.57 MIU/L

## 2024-11-26 ENCOUNTER — TELEPHONE (OUTPATIENT)
Dept: CASE MANAGEMENT | Age: 29
End: 2024-11-26

## 2024-11-26 ENCOUNTER — PATIENT MESSAGE (OUTPATIENT)
Dept: CASE MANAGEMENT | Age: 29
End: 2024-11-26

## 2024-11-26 DIAGNOSIS — R93.2 ABNORMAL LIVER CT: Primary | ICD-10-CM

## 2024-11-26 NOTE — TELEPHONE ENCOUNTER
The health plan has suggested an alternative cpt code, 68161. If you are in agreement with this alternative code, please cancel initial order and write new order with cpt code 89757.    This matter is time sensitive. Patient is scheduled for tomorrow, 11/27/2024.

## 2024-11-27 ENCOUNTER — PATIENT MESSAGE (OUTPATIENT)
Dept: CASE MANAGEMENT | Age: 29
End: 2024-11-27

## 2024-12-30 ENCOUNTER — HOSPITAL ENCOUNTER (OUTPATIENT)
Dept: MRI IMAGING | Facility: HOSPITAL | Age: 29
Discharge: HOME | End: 2024-12-30
Attending: FAMILY MEDICINE
Payer: MEDICAID

## 2024-12-30 DIAGNOSIS — R93.2 ABNORMAL LIVER CT: ICD-10-CM

## 2024-12-30 PROCEDURE — 74183 MRI ABD W/O CNTR FLWD CNTR: CPT | Performed by: FAMILY MEDICINE

## 2024-12-30 PROCEDURE — A9575 INJ GADOTERATE MEGLUMI 0.1ML: HCPCS | Performed by: FAMILY MEDICINE

## 2024-12-30 RX ORDER — GADOTERATE MEGLUMINE 376.9 MG/ML
20 INJECTION INTRAVENOUS
Status: COMPLETED | OUTPATIENT
Start: 2024-12-30 | End: 2024-12-30

## 2024-12-30 RX ADMIN — GADOTERATE MEGLUMINE 20 ML: 376.9 INJECTION INTRAVENOUS at 16:55:00

## 2024-12-31 DIAGNOSIS — E66.9 OBESITY (BMI 35.0-39.9 WITHOUT COMORBIDITY): ICD-10-CM

## 2024-12-31 DIAGNOSIS — R16.2 HEPATOSPLENOMEGALY: ICD-10-CM

## 2024-12-31 DIAGNOSIS — K76.0 FATTY LIVER: Primary | ICD-10-CM

## 2024-12-31 NOTE — PROGRESS NOTES
MRI confirms 3 benign hemangiomas in the liver  Also there is a another lesion that looks benign and is 1.2 cm.    Fatty liver with enlarged liver and spleen referring you to gastroenterologist for consultation.  .  Gastroenterologist  Cliff Flores MD   [address] [place] [address]  [facility] [id number]   [phone]

## 2025-01-07 DIAGNOSIS — E89.0 POSTOPERATIVE HYPOTHYROIDISM: ICD-10-CM

## 2025-01-07 RX ORDER — LEVOTHYROXINE SODIUM 137 UG/1
137 TABLET ORAL DAILY
Qty: 30 TABLET | Refills: 1 | Status: SHIPPED | OUTPATIENT
Start: 2025-01-07

## 2025-01-24 ENCOUNTER — HOSPITAL ENCOUNTER (OUTPATIENT)
Age: 30
Discharge: HOME | End: 2025-01-24
Payer: MEDICAID

## 2025-01-24 VITALS
TEMPERATURE: 99 F | HEIGHT: 69 IN | SYSTOLIC BLOOD PRESSURE: 126 MMHG | WEIGHT: 263 LBS | BODY MASS INDEX: 38.95 KG/M2 | RESPIRATION RATE: 22 BRPM | OXYGEN SATURATION: 97 % | DIASTOLIC BLOOD PRESSURE: 80 MMHG | HEART RATE: 66 BPM

## 2025-01-24 DIAGNOSIS — J01.11 ACUTE RECURRENT FRONTAL SINUSITIS: ICD-10-CM

## 2025-01-24 DIAGNOSIS — R09.81 CONGESTION OF NASAL SINUS: Primary | ICD-10-CM

## 2025-01-24 LAB
POCT INFLUENZA A: NEGATIVE
POCT INFLUENZA B: NEGATIVE
SARS-COV-2 RNA RESP QL NAA+PROBE: NOT DETECTED

## 2025-01-24 PROCEDURE — 99214 OFFICE O/P EST MOD 30 MIN: CPT | Performed by: PHYSICIAN ASSISTANT

## 2025-01-24 PROCEDURE — 87502 INFLUENZA DNA AMP PROBE: CPT | Performed by: PHYSICIAN ASSISTANT

## 2025-01-24 PROCEDURE — U0002 COVID-19 LAB TEST NON-CDC: HCPCS | Performed by: PHYSICIAN ASSISTANT

## 2025-01-24 RX ORDER — FLUTICASONE PROPIONATE 50 MCG
2 SPRAY, SUSPENSION (ML) NASAL DAILY
Qty: 16 G | Refills: 0 | Status: SHIPPED | OUTPATIENT
Start: 2025-01-24

## 2025-01-24 RX ORDER — PSEUDOEPHEDRINE HCL 30 MG/1
30 TABLET, FILM COATED ORAL 3 TIMES DAILY
Qty: 15 TABLET | Refills: 0 | Status: SHIPPED | OUTPATIENT
Start: 2025-01-24 | End: 2025-01-29

## 2025-01-24 RX ORDER — OXYMETAZOLINE HYDROCHLORIDE 0.05 G/100ML
1 SPRAY NASAL EVERY 4 HOURS PRN
Qty: 15 ML | Refills: 0 | Status: SHIPPED | OUTPATIENT
Start: 2025-01-24 | End: 2025-02-23

## 2025-01-24 NOTE — ED PROVIDER NOTES
Patient Seen in: [facility]      History     Chief Complaint   Patient presents with    Sinus Problem     Stated Complaint: sinus infection    Subjective:   HPI  Sahara Gibbs is a 29 year old female presents with acute onset of URI symptoms x 2 days. Patient reports  sinus congestion, non productive cough, rhinorrhea.  Patient denies dysphagia, throat pain, ear pain,  fevers, chills, shortness of breath, respiratory distress, stridor, neck pain/ stiffness, headache, eye pain/ redness, facial/ lip/ eyelid swelling. No medications taken prior to arrival. No alleviating/ aggravating factors. Patient is  concerned about COVID 19 infection at this encounter.  Patient is  immunized for COVID 19.        Objective:     Past Medical History:    Allergic rhinitis    Anxiety    Candidiasis of vulva and vagina    Depression    Disorder of thyroid    Dry skin dermatitis    Hypothyroidism    Obesity    Personal history of neurosis    Prolonged depressive reaction    Smoker    Visual impairment    glasses              Past Surgical History:   Procedure Laterality Date    Thyroidectomy post prev thyr surg  2017                Social History     Socioeconomic History    Marital status: Single   Tobacco Use    Smoking status: Former     Current packs/day: 0.00     Average packs/day: 0.3 packs/day for 7.0 years (1.8 ttl pk-yrs)     Types: Cigarettes     Start date: 2013     Quit date: 2020     Years since quittin.9    Smokeless tobacco: Never   Vaping Use    Vaping status: Every Day    Start date: 2020    Substances: Nicotine    Devices: Disposable   Substance and Sexual Activity    Alcohol use: Yes     Alcohol/week: 0.0 standard drinks of alcohol     Comment: rarely    Drug use: Yes     Frequency: 7.0 times per week     Types: Cannabis   Other Topics Concern     Service No    Blood Transfusions No    Caffeine Concern Yes     Comment: 1 coffee, 1 tea, & 1-2 soda daily     Occupational Exposure No    Hobby Hazards No    Sleep Concern No    Stress Concern Yes    Weight Concern Yes    Special Diet No    Back Care No    Exercise Yes     Comment: 3 times weekly    Bike Helmet No    Seat Belt No    Self-Exams No              Review of Systems   All other systems reviewed and are negative.      Positive for stated complaint: sinus infection  Other systems are as noted in HPI.  Constitutional and vital signs reviewed.      All other systems reviewed and negative except as noted above.    Physical Exam     ED Triage Vitals [01/24/25 1101]   /80   Pulse 66   Resp 22   Temp 98.5 °F (36.9 °C)   Temp src Oral   SpO2 97 %   O2 Device None (Room air)       Current Vitals:   Vital Signs  BP: 126/80  Pulse: 66  Resp: 22  Temp: 98.5 °F (36.9 °C)  Temp src: Oral    Oxygen Therapy  SpO2: 97 %  O2 Device: None (Room air)        Physical Exam  Vitals and nursing note reviewed.   Constitutional:       General: She is not in acute distress.     Appearance: Normal appearance. She is normal weight. She is not ill-appearing, toxic-appearing or diaphoretic.   HENT:      Head: Normocephalic and atraumatic.      Right Ear: Tympanic membrane, ear canal and external ear normal.      Left Ear: Tympanic membrane, ear canal and external ear normal.      Nose: Congestion present. No rhinorrhea.      Mouth/Throat:      Mouth: Mucous membranes are moist.      Pharynx: Oropharynx is clear. No oropharyngeal exudate or posterior oropharyngeal erythema.   Eyes:      Extraocular Movements: Extraocular movements intact.      Conjunctiva/sclera: Conjunctivae normal.      Pupils: Pupils are equal, round, and reactive to light.   Pulmonary:      Effort: Pulmonary effort is normal. No respiratory distress.      Breath sounds: No stridor. No wheezing, rhonchi or rales.   Chest:      Chest wall: No tenderness.   Musculoskeletal:         General: No swelling, tenderness, deformity or signs of injury. Normal range of motion.       Cervical back: Normal range of motion and neck supple.   Skin:     General: Skin is warm and dry.      Capillary Refill: Capillary refill takes less than 2 seconds.      Coloration: Skin is not jaundiced or pale.      Findings: No bruising, erythema, lesion or rash.   Neurological:      General: No focal deficit present.      Mental Status: She is alert and oriented to person, place, and time. Mental status is at baseline.   Psychiatric:         Mood and Affect: Mood normal.         Behavior: Behavior normal.             ED Course     Labs Reviewed   RAPID SARS-COV-2 BY PCR - Normal   POCT FLU TEST - Normal    Narrative:     This assay is a rapid molecular in vitro test utilizing nucleic acid amplification of influenza A and B viral RNA.     Results for orders placed or performed during the hospital encounter of 01/24/25   Rapid SARS-CoV-2 by PCR    Collection Time: 01/24/25 11:27 AM    Specimen: Nares; Other   Result Value Ref Range    Rapid SARS-CoV-2 by PCR Not Detected Not Detected   POCT Flu Test    Collection Time: 01/24/25 11:27 AM    Specimen: Nares; Other   Result Value Ref Range    POCT INFLUENZA A Negative Negative    POCT INFLUENZA B Negative Negative     Vitals:    01/24/25 1101   BP: 126/80   Pulse: 66   Resp: 22   Temp: 98.5 °F (36.9 °C)                   MDM             Medical Decision Making  29 year old well appearing female presents with acute onset of URI symptoms x 2 days..  Considerations to include but not limited to bronchitis vs pneumonia vs COVID 19 vs influenza A vs influenza B.  Patient is overall well-appearing, normotensive, nontachycardic, not dyspneic with oxygen saturation at 97% on room air    Plan  - COVID 19/ flu A and B  swab  - SpO2 97% on room air  - reassess  - DC to home   - rx: augmentin 875mg po BID x 7 days. Flonase 2 sprays to bilateral nostrils BID.  Afrin 2 sprays to bilateral nostrils BID for no more than 48 consecutive hours to avoid rebound congestion.  Sudafed  30mg po q 6 hours.      - refer to PCP for further evaluation    - return to ED        Amount and/or Complexity of Data Reviewed  Labs: ordered. Decision-making details documented in ED Course.     Details: COVID 19/ flu A and B  swab- negative         Disposition and Plan     Clinical Impression:  1. Congestion of nasal sinus    2. Acute recurrent frontal sinusitis         Disposition:  There is no disposition on file for this visit.  There is no disposition time on file for this visit.    Follow-up:  Alison Paul DO  [address] [facility] [address]  [facility] [id number] [phone]          [address] [id number] [phone]              Medications Prescribed:  Current Discharge Medication List        START taking these medications    Details   fluticasone propionate 50 MCG/ACT Nasal Suspension 2 sprays by Nasal route daily.  Qty: 16 g, Refills: 0      oxymetazoline 0.05 % Nasal Solution 1 spray by Nasal route every 4 (four) hours as needed for congestion.  Qty: 15 mL, Refills: 0      pseudoephedrine 30 MG Oral Tab Take 1 tablet (30 mg total) by mouth in the morning, at noon, and at bedtime for 5 days.  Qty: 15 tablet, Refills: 0    Associated Diagnoses: Acute recurrent frontal sinusitis      amoxicillin clavulanate 875-125 MG Oral Tab Take 1 tablet by mouth 2 (two) times daily for 7 days.  Qty: 14 tablet, Refills: 0                 Supplementary Documentation:

## 2025-01-29 ENCOUNTER — OFFICE VISIT (OUTPATIENT)
Facility: CLINIC | Age: 30
End: 2025-01-29
Payer: MEDICAID

## 2025-01-29 VITALS — WEIGHT: 263 LBS | SYSTOLIC BLOOD PRESSURE: 149 MMHG | DIASTOLIC BLOOD PRESSURE: 86 MMHG | BODY MASS INDEX: 39 KG/M2

## 2025-01-29 DIAGNOSIS — R10.13 EPIGASTRIC PAIN: Primary | ICD-10-CM

## 2025-01-29 DIAGNOSIS — R93.2 ABNORMAL MRI, LIVER: ICD-10-CM

## 2025-01-29 DIAGNOSIS — R16.2 HEPATOSPLENOMEGALY: ICD-10-CM

## 2025-01-29 DIAGNOSIS — K76.9 LIVER LESION: ICD-10-CM

## 2025-01-29 PROCEDURE — 99204 OFFICE O/P NEW MOD 45 MIN: CPT

## 2025-01-29 NOTE — H&P
[facility] - Gastroenterology                                                                                                               Reason for consult: liver disease    Requesting physician or provider: Alison Paul,     No chief complaint on file.      HPI:   Sahara Gibbs is a 29 year old year-old female with active diagnoses including anxiety, depression high cholesterol. Prior medical/surgical history thyroidectomy, in note table.    she is here today for evaluation  #RUQ abd pain  #hepatomegaly  #hepatic steatosis  #hepatic lesions  -referred to GI for abnormal liver imaging. Most recent LFTs on 11/5/24 were all WNL  -MRI abdomen on 12/30/24  1. There are 3 benign intrahepatic hemangiomas as described above, the largest of which is seen within segment 8 measuring 1.6 x 2.0 cm.   2. There is a 4th intrahepatic lesion consistent with a benign focus of FNH, which is also located within segment 8 measuring 1.0 x 1.2 cm.   3. Mild patchy fatty infiltration of the liver/steatosis with hepatosplenomegaly.   -she does report almost daily RUQ pressure like abd pain for months     Personal hx of liver disease or hepatitis: none   Family hx of liver disease: none   Etoh use: rare  Meds, herbals, vitamins: none hepatotoxic identified. LFT elevation most common with quetiapine, but liver injury uncommon  Illicit drug use: marijuana use  Autoimmune conditions: none   Toxin exposure at work: none     #dyspepsia  -wakes up with upset stomach & nausea most morning for months. Sometimes feels better after eating or after bowel movement. Has mostly daily bowel movement  -smoking marijuana also helps  -reports dysphagia with pills, eats breakfast food to get pill to pass, reports it feels stuck if only liquids    Patient denies symptoms of vomiting, odynophagia, globus sensation, heartburn, hematemesis, change in bowel  habits, constipation, diarrhea, hematochezia, or melena. she denies recent change in appetite, fever or unintentional weight loss.      Last colonoscopy: none   Last EGD: none     NSAIDS: none   Tobacco: former  Alcohol: rare  Marijuana: daily for chronic dyspepsia  Illicit drugs: none     FH GI malignancy: none   FH IBD: none     No history of adverse reaction to sedation  No DEAN  No anticoagulants/antiplatelet  No pacemaker/defibrillator    Wt Readings from Last 6 Encounters:   25 263 lb (119.3 kg)   25 263 lb (119.3 kg)   24 261 lb (118.4 kg)   24 250 lb (113.4 kg)   23 256 lb (116.1 kg)   23 255 lb (115.7 kg)        History, Medications, Allergies, ROS:      Past Medical History:    Allergic rhinitis    Anxiety    Candidiasis of vulva and vagina    Depression    Disorder of thyroid    Dry skin dermatitis    Hypothyroidism    Obesity    Personal history of neurosis    Prolonged depressive reaction    Smoker    Visual impairment    glasses      Past Surgical History:   Procedure Laterality Date    Thyroidectomy post prev thyr surg  2017      Family Hx:   Family History   Problem Relation Age of Onset    Hypertension Father     Diabetes Father     Obesity Mother     Diabetes Maternal Grandfather     Colon Cancer Paternal Grandmother     No Known Problems Sister       Social History:   Social History     Socioeconomic History    Marital status: Single   Tobacco Use    Smoking status: Former     Current packs/day: 0.00     Average packs/day: 0.3 packs/day for 7.0 years (1.8 ttl pk-yrs)     Types: Cigarettes     Start date: 2013     Quit date: 2020     Years since quittin.9    Smokeless tobacco: Never   Vaping Use    Vaping status: Every Day    Start date: 2020    Substances: Nicotine    Devices: Disposable   Substance and Sexual Activity    Alcohol use: Yes     Alcohol/week: 0.0 standard drinks of alcohol     Comment: rarely    Drug use: Yes     Frequency: 7.0  times per week     Types: Cannabis     Comment: daily   Other Topics Concern     Service No    Blood Transfusions No    Caffeine Concern Yes     Comment: 1 coffee, 1 tea, & 1-2 soda daily    Occupational Exposure No    Hobby Hazards No    Sleep Concern No    Stress Concern Yes    Weight Concern Yes    Special Diet No    Back Care No    Exercise Yes     Comment: 3 times weekly    Bike Helmet No    Seat Belt No    Self-Exams No        Medications (Active prior to today's visit):  Current Outpatient Medications   Medication Sig Dispense Refill    fluticasone propionate 50 MCG/ACT Nasal Suspension 2 sprays by Nasal route daily. 16 g 0    oxymetazoline 0.05 % Nasal Solution 1 spray by Nasal route every 4 (four) hours as needed for congestion. 15 mL 0    pseudoephedrine 30 MG Oral Tab Take 1 tablet (30 mg total) by mouth in the morning, at noon, and at bedtime for 5 days. 15 tablet 0    amoxicillin clavulanate 875-125 MG Oral Tab Take 1 tablet by mouth 2 (two) times daily for 7 days. 14 tablet 0    LEVOTHYROXINE 137 MCG Oral Tab TAKE 1 TABLET BY MOUTH DAILY 30 tablet 1    rosuvastatin 10 MG Oral Tab Take 1 tablet (10 mg total) by mouth nightly. 90 tablet 1    QUEtiapine 100 MG Oral Tab Take 1 tablet (100 mg total) by mouth nightly.      Levonorgestrel-Ethinyl Estrad (VIENVA) 0.1-20 MG-MCG Oral Tab Take 1 tablet by mouth daily. 84 tablet 3    clonazePAM 0.5 MG Oral Tab Take a 1/2 tablet daily as needed (Patient taking differently: Take 1 tablet (0.5 mg total) by mouth daily. Take a 1/2 tablet daily as needed) 15 tablet 0    FLUoxetine HCl 40 MG Oral Cap Take 2 capsules (80 mg total) by mouth daily. 60 capsule 2    busPIRone HCl 30 MG Oral Tab Take 1 tablet (30 mg total) by mouth 2 (two) times daily. 60 tablet 1       Allergies:  Allergies[1]    ROS:   CONSTITUTIONAL: negative for fevers, chills, sweats  EYES Negative for scleral icterus or redness, and diplopia  HEENT: Negative for hoarseness  RESPIRATORY: Negative  for cough and severe shortness of breath  CARDIOVASCULAR: Negative for crushing sub-sternal chest pain  GASTROINTESTINAL: See HPI  GENITOURINARY: Negative for dysuria  MUSCULOSKELETAL: Negative for arthralgias and myalgias  SKIN: Negative for jaundice, rash or pruritus  NEUROLOGICAL: Negative for dizziness and headaches  BEHAVIOR/PSYCH: Negative for psychotic behavior    PHYSICAL EXAM:   Blood pressure 149/86, weight 263 lb (119.3 kg), not currently breastfeeding.    GEN: Alert, no acute distress, well-nourished   HEENT: anicteric sclera, neck supple, trachea midline, MMM, no palpable or tender neck or supraclavicular lymph nodes  CV: RRR, the extremities are warm and well perfused   LUNGS: No increased work of breathing, CTAB  ABDOMEN: Soft, symmetrical, non-tender without distention or guarding. No scars or lesions. Aorta is without bruit or visible pulsation. Umbilicus is midline without herniation. Normoactive bowel sounds are present, No masses, hepatomegaly or splenomegaly noted. Exam limited by body habitus  MSK: No erythema, no warmth, no swelling of joints  SKIN: No jaundice, no erythema, no rashes, no lesions  HEMATOLOGIC: No bleeding, no bruising  NEURO: Alert and interactive, HINKLE  PSYCH: appropriate mood & affect    Labs/Imaging/Procedures:     Patient's pertinent labs and imaging were reviewed and discussed with patient today.        .  ASSESSMENT/PLAN:   Sahara Gibbs is a 29 year old year-old female with active diagnoses including anxiety, depression high cholesterol. Prior medical/surgical history thyroidectomy, in note table.    she is here today for evaluation  #RUQ abd pain  #hepatomegaly  #hepatic steatosis  #hepatic lesions  -referred to GI for abnormal liver imaging. Most recent LFTs on 11/5/24 were all WNL  -MRI abdomen on 12/30/24  1. There are 3 benign intrahepatic hemangiomas as described above, the largest of which is seen within segment 8 measuring 1.6 x 2.0 cm.   2. There is a 4th  intrahepatic lesion consistent with a benign focus of FNH, which is also located within segment 8 measuring 1.0 x 1.2 cm.   3. Mild patchy fatty infiltration of the liver/steatosis with hepatosplenomegaly.   -she does report almost daily RUQ pressure like abd pain for months     -No meds hepatotoxic identified. LFT elevation most common with quetiapine, liver injury uncommon. Discussed MASLD diet/lifestyle and management of metabolic dysfunction. Ordered liver disease labs. Hematology referral for splenomegaly    #dyspepsia  -wakes up with upset stomach & nausea most morning for months. Sometimes feels better after eating or after bowel movement. Has mostly daily bowel movement  -smoking marijuana also helps  -reports dysphagia with pills, eats breakfast food to get pill to pass, reports it feels stuck if only liquids  -recommend H. Pylori test, trial acid reducer     Recommendations:  Your labs & imaging indicate that you have   Hepatic steatosis / fatty liver disease  -extra fat on the liver, without inflammation    Recommendations:  -weight loss goal: lose 5-7% of body weight gradually (1-2lb per week)  -avoid alcohol  -improve blood pressure & cholesterol (take prescribed medications, eat healthy, exercise)  -manage diabetes to control blood glucose  -establish with a dietician for weight loss  -recheck liver function tests in 6 months    -go to lab for H. Pylori test & liver blood work     -follow up in 3 months          Orders This Visit:  Orders Placed This Encounter   Procedures    Helicobacter Pylori Breath Test, Adult    Actin (Smooth Muscle) Antibody    Ceruloplasmin    Iron And Tibc    Mitochondrial (M2) Antibody    Hepatitis A B + C profile       Meds This Visit:  Requested Prescriptions      No prescriptions requested or ordered in this encounter       Imaging & Referrals:  ONCOLOGY/HEMATOLOGY - INTERNAL      XANDER Bowman    [facility] Gastroenterology  1/29/2025        This note was  partially prepared using Dragon Medical voice recognition dictation software. As a result, errors may occur. When identified, these errors have been corrected. While every attempt is made to correct errors during dictation, discrepancies may still exist.          [1]   Allergies  Allergen Reactions    Wellbutrin [Bupropion] ANXIETY

## 2025-01-29 NOTE — PATIENT INSTRUCTIONS
Your labs & imaging indicate that you have   Hepatic steatosis / fatty liver disease  -extra fat on the liver, without inflammation    Recommendations:  -weight loss goal: lose 5-7% of body weight gradually (1-2lb per week)  -avoid alcohol  -improve blood pressure & cholesterol (take prescribed medications, eat healthy, exercise)  -manage diabetes to control blood glucose  -establish with a dietician for weight loss  -recheck liver function tests in 6 months    -go to lab for H. Pylori test & liver blood work     -follow up in 3 months

## 2025-02-20 LAB — RESULT:: NOT DETECTED

## 2025-02-21 LAB
% SATURATION: 19 % (CALC) (ref 16–45)
ACTIN (SMOOTH MUSCLE)$ANTIBODY (IGG): <20 U
CERULOPLASMIN: 58 MG/DL (ref 14–48)
IRON BINDING CAPACITY: 375 MCG/DL (CALC) (ref 250–450)
IRON, TOTAL: 71 MCG/DL (ref 40–190)
MITOCHONDRIAL AB SCREEN: NEGATIVE

## 2025-03-09 DIAGNOSIS — E89.0 POSTOPERATIVE HYPOTHYROIDISM: ICD-10-CM

## 2025-03-10 RX ORDER — LEVOTHYROXINE SODIUM 137 UG/1
137 TABLET ORAL DAILY
Qty: 30 TABLET | Refills: 1 | Status: SHIPPED | OUTPATIENT
Start: 2025-03-10

## 2025-04-04 DIAGNOSIS — E78.2 MIXED HYPERLIPIDEMIA: ICD-10-CM

## 2025-04-04 RX ORDER — ROSUVASTATIN CALCIUM 10 MG/1
10 TABLET, COATED ORAL NIGHTLY
Qty: 90 TABLET | Refills: 1 | Status: SHIPPED | OUTPATIENT
Start: 2025-04-04

## 2025-05-04 DIAGNOSIS — E89.0 POSTOPERATIVE HYPOTHYROIDISM: ICD-10-CM

## 2025-05-05 RX ORDER — LEVOTHYROXINE SODIUM 137 UG/1
137 TABLET ORAL DAILY
Qty: 30 TABLET | Refills: 1 | OUTPATIENT
Start: 2025-05-05

## 2025-05-06 DIAGNOSIS — E89.0 POSTOPERATIVE HYPOTHYROIDISM: ICD-10-CM

## 2025-05-06 RX ORDER — LEVOTHYROXINE SODIUM 137 UG/1
137 TABLET ORAL DAILY
Qty: 30 TABLET | Refills: 0 | Status: SHIPPED | OUTPATIENT
Start: 2025-05-06

## 2025-05-06 NOTE — TELEPHONE ENCOUNTER
Sahara wants to know why prescription was denied. LEVOTHYROXINE 137 MCG Oral Tab [[id number]] (Order [id number]) is the medication. Sahara is requesting to talk to nurse.  [id number] - [place], [place] - [id number] AT [facility], [phone], [phone]   [id number] [facility] [address]   Phone: [phone] Fax: [phone]   Hours: Not open 24 hours

## 2025-06-03 DIAGNOSIS — E89.0 POSTOPERATIVE HYPOTHYROIDISM: ICD-10-CM

## 2025-06-03 RX ORDER — LEVOTHYROXINE SODIUM 137 UG/1
137 TABLET ORAL DAILY
Qty: 30 TABLET | Refills: 0 | Status: SHIPPED | OUTPATIENT
Start: 2025-06-03

## 2025-07-02 DIAGNOSIS — E89.0 POSTOPERATIVE HYPOTHYROIDISM: ICD-10-CM

## 2025-07-02 RX ORDER — LEVOTHYROXINE SODIUM 137 UG/1
137 TABLET ORAL DAILY
Qty: 30 TABLET | Refills: 0 | Status: SHIPPED | OUTPATIENT
Start: 2025-07-02

## 2025-07-10 DIAGNOSIS — Z01.419 WELL FEMALE EXAM WITH ROUTINE GYNECOLOGICAL EXAM: ICD-10-CM

## 2025-07-10 RX ORDER — LEVONORGESTREL AND ETHINYL ESTRADIOL 0.1-0.02MG
1 KIT ORAL DAILY
Qty: 84 TABLET | Refills: 0 | Status: SHIPPED | OUTPATIENT
Start: 2025-07-10

## 2025-07-10 NOTE — TELEPHONE ENCOUNTER
Left voicemail/sent [employer] message/letter reminding patient that they are due for PHYSICAL with Fina Winters PA-C. Instructed the patient to return the call at [phone] or to schedule through [employer].

## 2025-08-02 DIAGNOSIS — E89.0 POSTOPERATIVE HYPOTHYROIDISM: ICD-10-CM

## 2025-08-04 RX ORDER — LEVOTHYROXINE SODIUM 137 UG/1
137 TABLET ORAL DAILY
Qty: 30 TABLET | Refills: 0 | Status: SHIPPED | OUTPATIENT
Start: 2025-08-04

## 2025-08-25 ENCOUNTER — TELEPHONE (OUTPATIENT)
Dept: FAMILY MEDICINE CLINIC | Facility: CLINIC | Age: 30
End: 2025-08-25

## (undated) DIAGNOSIS — Z01.419 WELL FEMALE EXAM WITH ROUTINE GYNECOLOGICAL EXAM: ICD-10-CM

## (undated) DEVICE — MEDI-VAC SUCTION FINE CAPACITY: Brand: CARDINAL HEALTH

## (undated) DEVICE — KENDALL SCD EXPRESS SLEEVES, KNEE LENGTH, MEDIUM: Brand: KENDALL SCD

## (undated) DEVICE — SUTURE VICRYL 3-0 SH

## (undated) DEVICE — HEAD AND NECK CDS-LF: Brand: MEDLINE INDUSTRIES, INC.

## (undated) DEVICE — PAD SACRAL SPAN AID

## (undated) DEVICE — GLOVE ALOETOUCH ORTHO SZ 8

## (undated) DEVICE — HEMOCLIP HORIZON MED 002200

## (undated) DEVICE — CHLORAPREP ORANGE TINT 10.5ML

## (undated) DEVICE — PROBE 8225101 5PK STD PRASS FL TIP ROHS

## (undated) DEVICE — AIRWAY ENDO  TRIVANTAGE 7MM

## (undated) DEVICE — CAUTERY BLADE 2IN INS E1455

## (undated) DEVICE — SOL  .9 1000ML BTL

## (undated) DEVICE — RETRACT LONE STAR STAYS DULL

## (undated) DEVICE — ARISTA 1 GRAM

## (undated) DEVICE — SPONGE: SPECIALTY PEANUT XR 100/CS: Brand: MEDICAL ACTION INDUSTRIES

## (undated) DEVICE — SUTURE MONOCRYL 4-0 PS-2

## (undated) DEVICE — BIPOLAR FORCEPS CORD,BANANA LEADS: Brand: VALLEYLAB

## (undated) DEVICE — LIGACLIP EXTRA LIGATING CLIP CARTRIDGES: 6 TITANIUM CLIPS/ CARTRIDGE (SMALL): Brand: LIGACLIP

## (undated) DEVICE — 3M™ STERI-STRIP™ REINFORCED ADHESIVE SKIN CLOSURES, R1547, 1/2 IN X 4 IN (12 MM X 100 MM), 6 STRIPS/ENVELOPE: Brand: 3M™ STERI-STRIP™

## (undated) DEVICE — SPONGE RAYTEC 4X4 RF DETECT

## (undated) NOTE — IP AVS SNAPSHOT
[facility] [place] [address] [place], [address] ~ [phone]                Discharge Summary   4/5/2017    [facility]           Admission Information        Provider Department    4/5/2017 John Stovall MD  3nw-A Take 0.25-0.5 mg by mouth 2 (two) times daily as needed for Anxiety. ANXIETY         ergocalciferol 77525 units Caps   Commonly known as:  DRISDOL/VITAMIN D2        Take 1 capsule (50,000 Units total) by mouth once a week.    Stop sharmaine Discharge Orders     Future Labs/Procedures Expected by Expires    [facility] CASE REQUEST SURGICAL  As directed     Questions:      Procedure requested time:  8:00 AM    Assisting Surgeon:      # of days to admit prior to surgery:      Special Equipment or Impla Neutrophil % Lymphocyte % Monocyte % Eosinophil % Basophil % Prelim Neut Abs Final Neut Abs Lymphocyte Abso Monocyte Absolu Eosinophil Abso Basophil Absolu    (02/08/17)  66.6 (02/08/17)  21.4 (02/08/17)  8.3 (02/08/17)  2.8 (02/08/17)  0.6 -- (02/08/17) Call [phone] for help. [employer] is NOT to be used for urgent needs.   For medical emergencies, dial 911.             _____________________________________________________________________________    Medication Side Effects - Medications to be taken at Vortioxetine HBr (TRINTELLIX) 10 MG Oral Tab       Use: Treat conditions such as depression and thought disorders   Most common side effects: Dizziness, drowsiness, problems with movement   What to report to your healthcare team: Changes in thinking, talk

## (undated) NOTE — LETTER
Date: 9/11/2020    Patient Name: Evelin Chavez          To Whom it may concern: The above patient was seen at the [facility] for treatment of a medical condition.     This patient should be excused from attending work from 8/11/20 jesse

## (undated) NOTE — MR AVS SNAPSHOT
After Visit Summary   5/21/2021    Romulo Javier    MRN: [id number]           Visit Information     Date & Time  5/21/2021  8:30 AM Provider  Mari Rubin PA-C [address], [facility].  Phone  [phone] Department    6/14/2021 8:10 AM [address], [place]      Instructions    Routine Healthcare for Women   Routine checkups can find treatable problems early.  For many medical problems, early treatment can help pr cancer. • Cholesterol test: if you are age 39 or older. You may start having this test at an earlier age if you have a family history of high cholesterol. • Colorectal cancer test: if you are 48 or older.  Recommended tests include a yearly test for blo for routine tests. You and your healthcare provider must discuss what is right for you based on your symptoms and your personal and family medical history.    Many other tests are often done at routine checkups, but there is no current evidence that they ar travel where hepatitis B is common. • Pneumococcal pneumonia shot if you are age 72 or older. You may need to get it at a younger age if you have a high-risk medical condition, such as diabetes.    • Varicella (chickenpox) if you have never had chickenpox of vitamin D daily and daily weight bearing exercise. [facility] now offers Video Visits through [address] for adult and pediatric patients.   Video Visits are available Monday - Friday for many common conditions such as allergies, colds, cough 10:00 am – 10:00 pm   Saturday – Sunday  10:00 am – 4:00 pm     [facility] [employer]   Monday – Friday  4:00 pm – 10:00 pm   Saturday – Sunday  10:00 am – 4:00 pm  [facility]  Emergency Medicine Providers  Conditions needing urgent attention, but are   non-life-t

## (undated) NOTE — LETTER
Date: 10/9/2020    Patient Name: Kamryn Jensen          To Whom it may concern: The above patient was seen at the [facility] for treatment of a medical condition.     This patient should be excused from attending work can return full du

## (undated) NOTE — MR AVS SNAPSHOT
[facility] [place]  [address] [facility]  [phone]               Thank you for choosing us for your health care visit with Mally Wynn PA-C.   We are glad to serve you and happy to provide you with Arlette Grider Your personal medical history and that of your family are also important. What needs to be checked and how often?    The tests listed below are recommended for routine healthcare by the US Preventive Services Task Force (USPSTF) and the [facility] o sigmoidoscopy every 5 years   double-contrast barium enema every 5 years   colonoscopy at least every 10 years.    You may need to start colorectal cancer screening earlier if a member of your immediate family has had colon cancer, especially if their can thyroid tests, and urine tests. When you have no symptoms of illness, you should discuss the pros and cons of these and other tests with your healthcare provider. Each test involves some expense. What shots do I need?    The following shots are recommende prevent shingles. It can also reduce the pain caused by shingles. What other things I can do to stay healthy? You should expect your healthcare provider to advise you regularly on other ways to stay healthy.  Some of these may include:   Breast self-exa This list is accurate as of: 3/13/17 11:59 PM.  Always use your most recent med list.                BusPIRone HCl 10 MG Tabs   Take 1 tablet (10 mg total) by mouth 3 (three) times daily.    Commonly known as:  BUSPAR           Calcium Carbonate-Vitamin D 6 Sari/GARY Source Urine                   MyChart     Visit MyChart  You can access your MyChart to more actively manage your health care and view more details from this visit by going to [email or web address].   If you've recently had a stay at the [facility] (DP/SNF)

## (undated) NOTE — Clinical Note
07/10/25        Sahara Gibbs  [address] [facility] [address]      Dear Sahara,    Our records indicate that you have outstanding lab work and or testing that was ordered for you and has not yet been completed:    To provide you with the best possible care, please complete these orders at your earliest convenience. If you have recently completed these orders please disregard this letter.     If you have any questions please call the office at No information on file..     Thank you,       Not in an encounter context.

## (undated) NOTE — LETTER
Date: 8/19/2020    Patient Name: Hugo Laird          To Whom it may concern: The above patient was seen at the [place] campus for treatment of a medical condition.     This patient should be excused from attending work from 8/11/20 donnyu

## (undated) NOTE — LETTER
Date & Time: 11/21/2020, 2:16 PM  Patient: Venkatesh Thompson  Encounter Provider(s):    Calixto Avila PA-C       To Whom It May Concern:    Sandro Holliday was seen and treated in our department on 11/21/2020.   COVID-19 precautions; please quarantin

## (undated) NOTE — LETTER
07/10/25    Sahara Gibbs  [address] [address]      July 10, 2025      Dear Sahara ,    Our records indicate you are now due for your Annual Wellness Exam. Your last Wellness Exam was on 08/22/2024 with Fina Winters PA-C.       Your health is important to us; please schedule this visit at your earliest convenience.     You may schedule directly through [employer], our [facility] website ([email or web address]) or by phone at [phone].        Sincerely,  [facility]  [address]  [place] [address]  [phone]

## (undated) NOTE — Clinical Note
04/12/2017        Sahara Gibbs  [address]      Dear Rodríguez Bridges records indicate that you have outstanding lab work and or testing that was ordered for you and has not yet been completed:      Lipid Panel [E]  To provid

## (undated) NOTE — LETTER
Date & Time: 7/25/2022, 9:00 AM  Patient: Santi Knee  Encounter Provider(s):    XANDER Chavarria       To Whom It May Concern:    Henrietta Neal was seen and treated in our department on 7/25/2022. She can return to work 7/26/22.     If you have any questions or concerns, please do not hesitate to call.        _____________________________  Physician/APC Signature

## (undated) NOTE — LETTER
Date & Time: 1/20/2020, 9:17 PM  Patient: Venkatesh Thompson  Encounter Provider(s):    Ganesh Humphrey MD       To Whom It May Concern:    Sandro Holliday was seen and treated in our department on 1/20/2020.  Pt Jewell butler at bedside during hospit

## (undated) NOTE — LETTER
Date & Time: 4/28/2024, 9:18 PM  Patient: Sahara Gibbs  Encounter Provider(s):    Ricardo Wallis MD       To Whom It May Concern:    Sahara Gibbs was seen and treated in our department on 4/28/2024. Sahara was accompanied by her . She can return to work with no restrictions.    If you have any questions or concerns, please do not hesitate to call.        _____________________________  Physician/APC Signature